# Patient Record
Sex: FEMALE | Race: WHITE | NOT HISPANIC OR LATINO | Employment: OTHER | URBAN - METROPOLITAN AREA
[De-identification: names, ages, dates, MRNs, and addresses within clinical notes are randomized per-mention and may not be internally consistent; named-entity substitution may affect disease eponyms.]

---

## 2017-02-27 ENCOUNTER — APPOINTMENT (OUTPATIENT)
Dept: WOMENS IMAGING | Facility: HOSPITAL | Age: 52
End: 2017-02-27

## 2017-02-27 PROCEDURE — 77063 BREAST TOMOSYNTHESIS BI: CPT | Performed by: RADIOLOGY

## 2017-02-27 PROCEDURE — 77067 SCR MAMMO BI INCL CAD: CPT | Performed by: RADIOLOGY

## 2017-05-20 ENCOUNTER — HOSPITAL ENCOUNTER (EMERGENCY)
Facility: HOSPITAL | Age: 52
Discharge: HOME | End: 2017-05-20
Attending: EMERGENCY MEDICINE | Admitting: EMERGENCY MEDICINE

## 2017-05-20 VITALS
DIASTOLIC BLOOD PRESSURE: 79 MMHG | BODY MASS INDEX: 18.95 KG/M2 | OXYGEN SATURATION: 100 % | WEIGHT: 111 LBS | TEMPERATURE: 97.9 F | SYSTOLIC BLOOD PRESSURE: 128 MMHG | HEART RATE: 109 BPM | RESPIRATION RATE: 16 BRPM | HEIGHT: 64 IN

## 2017-05-20 DIAGNOSIS — E06.9 THYROIDITIS: Primary | ICD-10-CM

## 2017-05-20 DIAGNOSIS — E05.90 HYPERTHYROIDISM: ICD-10-CM

## 2017-05-20 LAB
ALBUMIN SERPL-MCNC: 3.9 G/DL (ref 3.5–5.2)
ALBUMIN/GLOB SERPL: 1 G/DL
ALP SERPL-CCNC: 72 U/L (ref 39–117)
ALT SERPL W P-5'-P-CCNC: 16 U/L (ref 1–33)
ANION GAP SERPL CALCULATED.3IONS-SCNC: 13.7 MMOL/L
AST SERPL-CCNC: 16 U/L (ref 1–32)
BASOPHILS # BLD AUTO: 0.03 10*3/MM3 (ref 0–0.2)
BASOPHILS NFR BLD AUTO: 0.3 % (ref 0–1.5)
BILIRUB SERPL-MCNC: 0.8 MG/DL (ref 0.1–1.2)
BUN BLD-MCNC: 10 MG/DL (ref 6–20)
BUN/CREAT SERPL: 15.6 (ref 7–25)
CALCIUM SPEC-SCNC: 9.3 MG/DL (ref 8.6–10.5)
CHLORIDE SERPL-SCNC: 104 MMOL/L (ref 98–107)
CO2 SERPL-SCNC: 22.3 MMOL/L (ref 22–29)
CREAT BLD-MCNC: 0.64 MG/DL (ref 0.57–1)
DEPRECATED RDW RBC AUTO: 43.7 FL (ref 37–54)
EOSINOPHIL # BLD AUTO: 0.15 10*3/MM3 (ref 0–0.7)
EOSINOPHIL NFR BLD AUTO: 1.7 % (ref 0.3–6.2)
ERYTHROCYTE [DISTWIDTH] IN BLOOD BY AUTOMATED COUNT: 13.3 % (ref 11.7–13)
ERYTHROCYTE [SEDIMENTATION RATE] IN BLOOD: 66 MM/HR (ref 0–30)
GFR SERPL CREATININE-BSD FRML MDRD: 97 ML/MIN/1.73
GLOBULIN UR ELPH-MCNC: 3.8 GM/DL
GLUCOSE BLD-MCNC: 97 MG/DL (ref 65–99)
HCT VFR BLD AUTO: 32.5 % (ref 35.6–45.5)
HGB BLD-MCNC: 10.9 G/DL (ref 11.9–15.5)
IMM GRANULOCYTES # BLD: 0.02 10*3/MM3 (ref 0–0.03)
IMM GRANULOCYTES NFR BLD: 0.2 % (ref 0–0.5)
LYMPHOCYTES # BLD AUTO: 1.02 10*3/MM3 (ref 0.9–4.8)
LYMPHOCYTES NFR BLD AUTO: 11.8 % (ref 19.6–45.3)
MCH RBC QN AUTO: 30.4 PG (ref 26.9–32)
MCHC RBC AUTO-ENTMCNC: 33.5 G/DL (ref 32.4–36.3)
MCV RBC AUTO: 90.8 FL (ref 80.5–98.2)
MONOCYTES # BLD AUTO: 0.82 10*3/MM3 (ref 0.2–1.2)
MONOCYTES NFR BLD AUTO: 9.5 % (ref 5–12)
NEUTROPHILS # BLD AUTO: 6.59 10*3/MM3 (ref 1.9–8.1)
NEUTROPHILS NFR BLD AUTO: 76.5 % (ref 42.7–76)
PLATELET # BLD AUTO: 334 10*3/MM3 (ref 140–500)
PMV BLD AUTO: 8.7 FL (ref 6–12)
POTASSIUM BLD-SCNC: 4.1 MMOL/L (ref 3.5–5.2)
PROT SERPL-MCNC: 7.7 G/DL (ref 6–8.5)
RBC # BLD AUTO: 3.58 10*6/MM3 (ref 3.9–5.2)
SODIUM BLD-SCNC: 140 MMOL/L (ref 136–145)
T3FREE SERPL-MCNC: 6.25 PG/ML (ref 2–4.4)
T4 FREE SERPL-MCNC: 2.89 NG/DL (ref 0.93–1.7)
TSH SERPL DL<=0.05 MIU/L-ACNC: 0.01 MIU/ML (ref 0.27–4.2)
WBC NRBC COR # BLD: 8.63 10*3/MM3 (ref 4.5–10.7)

## 2017-05-20 PROCEDURE — 99283 EMERGENCY DEPT VISIT LOW MDM: CPT

## 2017-05-20 PROCEDURE — 93005 ELECTROCARDIOGRAM TRACING: CPT | Performed by: EMERGENCY MEDICINE

## 2017-05-20 PROCEDURE — 85652 RBC SED RATE AUTOMATED: CPT | Performed by: NURSE PRACTITIONER

## 2017-05-20 PROCEDURE — 84439 ASSAY OF FREE THYROXINE: CPT | Performed by: NURSE PRACTITIONER

## 2017-05-20 PROCEDURE — 80053 COMPREHEN METABOLIC PANEL: CPT | Performed by: NURSE PRACTITIONER

## 2017-05-20 PROCEDURE — 93010 ELECTROCARDIOGRAM REPORT: CPT | Performed by: INTERNAL MEDICINE

## 2017-05-20 PROCEDURE — 85025 COMPLETE CBC W/AUTO DIFF WBC: CPT | Performed by: NURSE PRACTITIONER

## 2017-05-20 PROCEDURE — 84481 FREE ASSAY (FT-3): CPT | Performed by: NURSE PRACTITIONER

## 2017-05-20 PROCEDURE — 84443 ASSAY THYROID STIM HORMONE: CPT | Performed by: NURSE PRACTITIONER

## 2017-05-20 RX ORDER — MELOXICAM 15 MG/1
15 TABLET ORAL DAILY
Qty: 14 TABLET | Refills: 0 | Status: SHIPPED | OUTPATIENT
Start: 2017-05-20 | End: 2019-01-02

## 2017-05-23 ENCOUNTER — TRANSCRIBE ORDERS (OUTPATIENT)
Dept: ADMINISTRATIVE | Facility: HOSPITAL | Age: 52
End: 2017-05-23

## 2017-05-23 DIAGNOSIS — E05.00 TOXIC GOITER: Primary | ICD-10-CM

## 2017-05-25 ENCOUNTER — HOSPITAL ENCOUNTER (OUTPATIENT)
Dept: ULTRASOUND IMAGING | Facility: HOSPITAL | Age: 52
Discharge: HOME | End: 2017-05-25
Admitting: INTERNAL MEDICINE

## 2017-05-25 DIAGNOSIS — E05.00 TOXIC GOITER: ICD-10-CM

## 2017-05-25 PROCEDURE — 76536 US EXAM OF HEAD AND NECK: CPT

## 2018-03-19 ENCOUNTER — APPOINTMENT (OUTPATIENT)
Dept: WOMENS IMAGING | Facility: HOSPITAL | Age: 53
End: 2018-03-19

## 2018-03-19 PROCEDURE — 77063 BREAST TOMOSYNTHESIS BI: CPT | Performed by: RADIOLOGY

## 2018-03-19 PROCEDURE — 77067 SCR MAMMO BI INCL CAD: CPT | Performed by: RADIOLOGY

## 2019-01-02 ENCOUNTER — OFFICE VISIT (OUTPATIENT)
Dept: INTERNAL MEDICINE | Facility: CLINIC | Age: 54
End: 2019-01-02

## 2019-01-02 VITALS
HEART RATE: 64 BPM | OXYGEN SATURATION: 100 % | SYSTOLIC BLOOD PRESSURE: 108 MMHG | WEIGHT: 115 LBS | BODY MASS INDEX: 19.63 KG/M2 | DIASTOLIC BLOOD PRESSURE: 54 MMHG | HEIGHT: 64 IN

## 2019-01-02 DIAGNOSIS — Z00.00 LABORATORY TESTS ORDERED AS PART OF A COMPLETE PHYSICAL EXAM (CPE): ICD-10-CM

## 2019-01-02 DIAGNOSIS — Z00.00 WELL ADULT EXAM: Primary | ICD-10-CM

## 2019-01-02 PROCEDURE — 93000 ELECTROCARDIOGRAM COMPLETE: CPT | Performed by: INTERNAL MEDICINE

## 2019-01-02 PROCEDURE — 99386 PREV VISIT NEW AGE 40-64: CPT | Performed by: INTERNAL MEDICINE

## 2019-01-02 RX ORDER — LEVOTHYROXINE SODIUM 0.05 MG/1
TABLET ORAL
COMMUNITY
Start: 2018-02-19 | End: 2021-03-26

## 2019-01-02 NOTE — PROGRESS NOTES
Subjective     Esperanza Deutsch is a 53 y.o. female who presents for a complete physical exam.      History of Present Illness     Patient here to establish care.      Review of Systems   Constitutional: Negative.    HENT: Negative.    Eyes: Negative.    Respiratory: Negative.    Cardiovascular: Negative.    Gastrointestinal: Negative.    Endocrine: Negative.    Genitourinary: Negative.    Musculoskeletal: Negative.    Skin: Negative.    Allergic/Immunologic: Negative.    Neurological: Negative.    Hematological: Negative.    Psychiatric/Behavioral: Negative.        The following portions of the patient's history were reviewed and updated as appropriate: allergies, current medications, past family history, past medical history, past social history, past surgical history and problem list.  Health maintenance tab was reviewed and updated with the patient.       Patient Active Problem List    Diagnosis Date Noted   • Allergic reaction 10/15/2016   • Other specified hypothyroidism 10/15/2016       Past Medical History:   Diagnosis Date   • Allergic reaction    • Anemia    • Disease of thyroid gland     SUBCLINICAL HYPOTHYROIDISM   • Herniated cervical disc    • Kidney stone 1999   • TMJ (dislocation of temporomandibular joint)    • Whiplash injury        Past Surgical History:   Procedure Laterality Date   • BREAST SURGERY Bilateral 2000    AUGMENTATION   • BREAST SURGERY Left     CYST   • COLONOSCOPY  11/20/2015     NO POLPS OR BXS   WNL  DR. BELTRAN   • TONSILLECTOMY AND ADENOIDECTOMY      AT AGE 5       Family History   Problem Relation Age of Onset   • Coronary artery disease Other    • Thrombocytopenia Daughter    • Cervical cancer Sister    • Other Paternal Uncle         BRAIN TUMOR   • Osteoporosis Maternal Grandmother    • Aneurysm Maternal Grandfather    • Lung cancer Paternal Grandfather    • Testicular cancer Paternal Uncle        Social History     Socioeconomic History   • Marital status:      Spouse  "name: Not on file   • Number of children: Not on file   • Years of education: Not on file   • Highest education level: Not on file   Social Needs   • Financial resource strain: Not on file   • Food insecurity - worry: Not on file   • Food insecurity - inability: Not on file   • Transportation needs - medical: Not on file   • Transportation needs - non-medical: Not on file   Occupational History   • Not on file   Tobacco Use   • Smoking status: Never Smoker   • Smokeless tobacco: Never Used   Substance and Sexual Activity   • Alcohol use: Yes     Comment: social   • Drug use: No   • Sexual activity: Yes     Partners: Male     Birth control/protection: Other     Comment: CONDOMS   Other Topics Concern   • Not on file   Social History Narrative   • Not on file       Current Outpatient Medications on File Prior to Visit   Medication Sig Dispense Refill   • Calcium Carbonate (CALCIUM 600 PO) Take  by mouth Daily.     • Cholecalciferol (VITAMIN D-3) 1000 UNITS capsule Take  by mouth.     • cyanocobalamin (CYANOCOBALAMIN) 100 MCG tablet tablet Take  by mouth Daily.     • Ferrous Sulfate (IRON) 325 (65 FE) MG tablet Take  by mouth Daily.     • levothyroxine (SYNTHROID, LEVOTHROID) 50 MCG tablet TK 1 T PO D     • MULTIPLE VITAMINS-MINERALS ER PO Take  by mouth Daily.     • [DISCONTINUED] diphenhydrAMINE (BENADRYL) 25 MG tablet Take  by mouth.     • [DISCONTINUED] meloxicam (MOBIC) 15 MG tablet Take 1 tablet by mouth Daily. 14 tablet 0     No current facility-administered medications on file prior to visit.        Allergies   Allergen Reactions   • Penicillins Anaphylaxis       Immunization History   Administered Date(s) Administered   • Flu Vaccine Quad PF 6-35MO 10/01/2018   • Hepatitis A 01/01/2016, 06/01/2016   • Influenza, Unspecified 09/01/2018       Objective     /54   Pulse 64   Ht 162.6 cm (64\")   Wt 52.2 kg (115 lb)   SpO2 100%   BMI 19.74 kg/m²     Physical Exam   Constitutional: She is oriented to " person, place, and time. She appears well-developed and well-nourished.   HENT:   Head: Normocephalic and atraumatic.   Right Ear: Hearing, tympanic membrane and external ear normal.   Left Ear: Hearing, tympanic membrane and external ear normal.   Nose: Nose normal.   Mouth/Throat: Oropharynx is clear and moist.   Neck: Neck supple. No thyromegaly present.   Cardiovascular: Normal rate, regular rhythm and normal heart sounds.   No murmur heard.  Pulmonary/Chest: Effort normal and breath sounds normal. Right breast exhibits no mass. Left breast exhibits no mass.   Abdominal: Soft. She exhibits no distension. There is no hepatosplenomegaly. There is no tenderness.   Genitourinary: No breast tenderness.   Lymphadenopathy:     She has no cervical adenopathy.   Neurological: She is alert and oriented to person, place, and time.   Skin: Skin is warm and dry.   Psychiatric: She has a normal mood and affect. Her speech is normal and behavior is normal. Judgment and thought content normal. Cognition and memory are normal.          ECG 12 Lead  Date/Time: 1/2/2019 4:18 PM  Performed by: Dasha Celeste MD  Authorized by: Dasha Celeste MD   Comparison: not compared with previous ECG   Previous ECG: no previous ECG available  Rhythm: sinus rhythm  Rate: normal  Conduction: conduction normal  T depression: V1 and V2  QRS axis: normal  Clinical impression: non-specific ECG              Assessment/Plan   Esperanza was seen today for annual exam.    Diagnoses and all orders for this visit:    Well adult exam    Laboratory tests ordered as part of a complete physical exam (CPE)  -     CBC & Differential  -     Comprehensive Metabolic Panel  -     Lipid Panel  -     Urinalysis With Microscopic - Urine, Clean Catch  -     Hepatitis C Antibody  -     Vitamin D 25 Hydroxy    Other orders  -     ECG 12 Lead        Discussion    Patient presents today for a CPE.      Patient follows a healthy diet.   Patient follows an adequate  exercise regimen. Mammogram is up to date.   Colon cancer screening is up to date.   Pap smears are performed by the patient's gynecologist.   DEXA is up to date.      I have recommended that the patient get the following immunizations:  Shingrix.      Health Maintenance   Topic Date Due   • ANNUAL PHYSICAL  04/24/1967   • TDAP/TD VACCINES (1 - Tdap) 04/24/1983   • ZOSTER VACCINE (1 of 2) 04/24/2014   • HEPATITIS C SCREENING  10/17/2016   • MAMMOGRAM  11/01/2020   • COLONOSCOPY  06/01/2021   • PAP SMEAR  11/01/2021   • INFLUENZA VACCINE  Completed   • HEPATITIS A VACCINE ADULT  Discontinued            Future Appointments   Date Time Provider Department Center   12/30/2019  8:00 AM [facility] None   1/6/2020  8:15 AM Dasha Celeste MD [facility] None

## 2019-01-03 LAB
25(OH)D3+25(OH)D2 SERPL-MCNC: 27.5 NG/ML (ref 30–100)
ALBUMIN SERPL-MCNC: 4.9 G/DL (ref 3.5–5.2)
ALBUMIN/GLOB SERPL: 2 G/DL
ALP SERPL-CCNC: 56 U/L (ref 39–117)
ALT SERPL-CCNC: 48 U/L (ref 1–33)
APPEARANCE UR: CLEAR
AST SERPL-CCNC: 35 U/L (ref 1–32)
BACTERIA #/AREA URNS HPF: NORMAL /HPF
BASOPHILS # BLD AUTO: 0.06 10*3/MM3 (ref 0–0.2)
BASOPHILS NFR BLD AUTO: 1 % (ref 0–1.5)
BILIRUB SERPL-MCNC: 0.8 MG/DL (ref 0.1–1.2)
BILIRUB UR QL STRIP: NEGATIVE
BUN SERPL-MCNC: 13 MG/DL (ref 6–20)
BUN/CREAT SERPL: 17.8 (ref 7–25)
CALCIUM SERPL-MCNC: 9.4 MG/DL (ref 8.6–10.5)
CASTS URNS MICRO: NORMAL
CHLORIDE SERPL-SCNC: 99 MMOL/L (ref 98–107)
CHOLEST SERPL-MCNC: 184 MG/DL (ref 0–200)
CO2 SERPL-SCNC: 25.4 MMOL/L (ref 22–29)
COLOR UR: YELLOW
CREAT SERPL-MCNC: 0.73 MG/DL (ref 0.57–1)
EOSINOPHIL # BLD AUTO: 0.1 10*3/MM3 (ref 0–0.7)
EOSINOPHIL NFR BLD AUTO: 1.7 % (ref 0.3–6.2)
EPI CELLS #/AREA URNS HPF: NORMAL /HPF
ERYTHROCYTE [DISTWIDTH] IN BLOOD BY AUTOMATED COUNT: 12.5 % (ref 11.7–13)
GLOBULIN SER CALC-MCNC: 2.5 GM/DL
GLUCOSE SERPL-MCNC: 82 MG/DL (ref 65–99)
GLUCOSE UR QL: NEGATIVE
HCT VFR BLD AUTO: 34.6 % (ref 35.6–45.5)
HCV AB S/CO SERPL IA: <0.1 S/CO RATIO (ref 0–0.9)
HDLC SERPL-MCNC: 66 MG/DL (ref 40–60)
HGB BLD-MCNC: 11.6 G/DL (ref 11.9–15.5)
HGB UR QL STRIP: NEGATIVE
IMM GRANULOCYTES # BLD: 0.01 10*3/MM3 (ref 0–0.03)
IMM GRANULOCYTES NFR BLD: 0.2 % (ref 0–0.5)
KETONES UR QL STRIP: (no result)
LDLC SERPL CALC-MCNC: 107 MG/DL (ref 0–100)
LEUKOCYTE ESTERASE UR QL STRIP: NEGATIVE
LYMPHOCYTES # BLD AUTO: 1.2 10*3/MM3 (ref 0.9–4.8)
LYMPHOCYTES NFR BLD AUTO: 20.9 % (ref 19.6–45.3)
MCH RBC QN AUTO: 30.9 PG (ref 26.9–32)
MCHC RBC AUTO-ENTMCNC: 33.5 G/DL (ref 32.4–36.3)
MCV RBC AUTO: 92 FL (ref 80.5–98.2)
MONOCYTES # BLD AUTO: 0.41 10*3/MM3 (ref 0.2–1.2)
MONOCYTES NFR BLD AUTO: 7.1 % (ref 5–12)
NEUTROPHILS # BLD AUTO: 3.98 10*3/MM3 (ref 1.9–8.1)
NEUTROPHILS NFR BLD AUTO: 69.3 % (ref 42.7–76)
NITRITE UR QL STRIP: NEGATIVE
PH UR STRIP: 6 [PH] (ref 5–8)
PLATELET # BLD AUTO: 315 10*3/MM3 (ref 140–500)
POTASSIUM SERPL-SCNC: 4.1 MMOL/L (ref 3.5–5.2)
PROT SERPL-MCNC: 7.4 G/DL (ref 6–8.5)
PROT UR QL STRIP: NEGATIVE
RBC # BLD AUTO: 3.76 10*6/MM3 (ref 3.9–5.2)
RBC #/AREA URNS HPF: NORMAL /HPF
SODIUM SERPL-SCNC: 139 MMOL/L (ref 136–145)
SP GR UR: 1.02 (ref 1–1.03)
TRIGL SERPL-MCNC: 56 MG/DL (ref 0–150)
UROBILINOGEN UR STRIP-MCNC: (no result) MG/DL
VLDLC SERPL CALC-MCNC: 11.2 MG/DL (ref 5–40)
WBC # BLD AUTO: 5.75 10*3/MM3 (ref 4.5–10.7)
WBC #/AREA URNS HPF: NORMAL /HPF

## 2019-01-04 DIAGNOSIS — R74.8 ABNORMAL LIVER ENZYMES: Primary | ICD-10-CM

## 2019-01-04 PROBLEM — D64.9 MILD CHRONIC ANEMIA: Status: ACTIVE | Noted: 2019-01-04

## 2019-01-04 PROBLEM — E55.9 VITAMIN D DEFICIENCY: Status: ACTIVE | Noted: 2019-01-04

## 2019-01-22 LAB
ALBUMIN SERPL-MCNC: 4.6 G/DL (ref 3.5–5.2)
ALP SERPL-CCNC: 43 U/L (ref 39–117)
ALT SERPL-CCNC: 17 U/L (ref 1–33)
AST SERPL-CCNC: 19 U/L (ref 1–32)
BILIRUB DIRECT SERPL-MCNC: 0.2 MG/DL (ref 0–0.3)
BILIRUB SERPL-MCNC: 1 MG/DL (ref 0.1–1.2)
PROT SERPL-MCNC: 7.2 G/DL (ref 6–8.5)

## 2019-03-25 ENCOUNTER — APPOINTMENT (OUTPATIENT)
Dept: WOMENS IMAGING | Facility: HOSPITAL | Age: 54
End: 2019-03-25

## 2019-03-25 PROCEDURE — 77067 SCR MAMMO BI INCL CAD: CPT | Performed by: RADIOLOGY

## 2019-03-25 PROCEDURE — 77063 BREAST TOMOSYNTHESIS BI: CPT | Performed by: RADIOLOGY

## 2019-12-24 DIAGNOSIS — Z00.00 HEALTH CARE MAINTENANCE: Primary | ICD-10-CM

## 2019-12-24 DIAGNOSIS — E55.9 VITAMIN D DEFICIENCY: ICD-10-CM

## 2019-12-31 LAB
25(OH)D3+25(OH)D2 SERPL-MCNC: 32.5 NG/ML (ref 30–100)
ALBUMIN SERPL-MCNC: 4.7 G/DL (ref 3.5–5.2)
ALBUMIN/GLOB SERPL: 1.9 G/DL
ALP SERPL-CCNC: 56 U/L (ref 39–117)
ALT SERPL-CCNC: 22 U/L (ref 1–33)
APPEARANCE UR: CLEAR
AST SERPL-CCNC: 22 U/L (ref 1–32)
BACTERIA #/AREA URNS HPF: NORMAL /HPF
BASOPHILS # BLD AUTO: 0.09 10*3/MM3 (ref 0–0.2)
BASOPHILS NFR BLD AUTO: 1.9 % (ref 0–1.5)
BILIRUB SERPL-MCNC: 0.8 MG/DL (ref 0.2–1.2)
BILIRUB UR QL STRIP: NEGATIVE
BUN SERPL-MCNC: 17 MG/DL (ref 6–20)
BUN/CREAT SERPL: 18.9 (ref 7–25)
CALCIUM SERPL-MCNC: 9.6 MG/DL (ref 8.6–10.5)
CHLORIDE SERPL-SCNC: 100 MMOL/L (ref 98–107)
CHOLEST SERPL-MCNC: 203 MG/DL (ref 0–200)
CO2 SERPL-SCNC: 24.9 MMOL/L (ref 22–29)
COLOR UR: YELLOW
CREAT SERPL-MCNC: 0.9 MG/DL (ref 0.57–1)
EOSINOPHIL # BLD AUTO: 0.31 10*3/MM3 (ref 0–0.4)
EOSINOPHIL NFR BLD AUTO: 6.4 % (ref 0.3–6.2)
EPI CELLS #/AREA URNS HPF: NORMAL /HPF (ref 0–10)
ERYTHROCYTE [DISTWIDTH] IN BLOOD BY AUTOMATED COUNT: 11.8 % (ref 12.3–15.4)
GLOBULIN SER CALC-MCNC: 2.5 GM/DL
GLUCOSE SERPL-MCNC: 92 MG/DL (ref 65–99)
GLUCOSE UR QL: NEGATIVE
HCT VFR BLD AUTO: 37.3 % (ref 34–46.6)
HDLC SERPL-MCNC: 80 MG/DL (ref 40–60)
HGB BLD-MCNC: 12.5 G/DL (ref 12–15.9)
HGB UR QL STRIP: NEGATIVE
IMM GRANULOCYTES # BLD AUTO: 0.01 10*3/MM3 (ref 0–0.05)
IMM GRANULOCYTES NFR BLD AUTO: 0.2 % (ref 0–0.5)
KETONES UR QL STRIP: NEGATIVE
LDLC SERPL CALC-MCNC: 111 MG/DL (ref 0–100)
LEUKOCYTE ESTERASE UR QL STRIP: NEGATIVE
LYMPHOCYTES # BLD AUTO: 1.52 10*3/MM3 (ref 0.7–3.1)
LYMPHOCYTES NFR BLD AUTO: 31.3 % (ref 19.6–45.3)
MCH RBC QN AUTO: 31 PG (ref 26.6–33)
MCHC RBC AUTO-ENTMCNC: 33.5 G/DL (ref 31.5–35.7)
MCV RBC AUTO: 92.6 FL (ref 79–97)
MICRO URNS: NORMAL
MICRO URNS: NORMAL
MONOCYTES # BLD AUTO: 0.48 10*3/MM3 (ref 0.1–0.9)
MONOCYTES NFR BLD AUTO: 9.9 % (ref 5–12)
MUCOUS THREADS URNS QL MICRO: PRESENT /HPF
NEUTROPHILS # BLD AUTO: 2.45 10*3/MM3 (ref 1.7–7)
NEUTROPHILS NFR BLD AUTO: 50.3 % (ref 42.7–76)
NITRITE UR QL STRIP: NEGATIVE
NRBC BLD AUTO-RTO: 0 /100 WBC (ref 0–0.2)
PH UR STRIP: 6 [PH] (ref 5–7.5)
PLATELET # BLD AUTO: 307 10*3/MM3 (ref 140–450)
POTASSIUM SERPL-SCNC: 4.7 MMOL/L (ref 3.5–5.2)
PROT SERPL-MCNC: 7.2 G/DL (ref 6–8.5)
PROT UR QL STRIP: NEGATIVE
RBC # BLD AUTO: 4.03 10*6/MM3 (ref 3.77–5.28)
RBC #/AREA URNS HPF: NORMAL /HPF (ref 0–2)
SODIUM SERPL-SCNC: 138 MMOL/L (ref 136–145)
SP GR UR: 1.02 (ref 1–1.03)
TRIGL SERPL-MCNC: 59 MG/DL (ref 0–150)
TSH SERPL DL<=0.005 MIU/L-ACNC: 3.94 UIU/ML (ref 0.27–4.2)
URINALYSIS REFLEX: NORMAL
UROBILINOGEN UR STRIP-MCNC: 0.2 MG/DL (ref 0.2–1)
VLDLC SERPL CALC-MCNC: 11.8 MG/DL
WBC # BLD AUTO: 4.86 10*3/MM3 (ref 3.4–10.8)
WBC #/AREA URNS HPF: NORMAL /HPF (ref 0–5)

## 2020-01-06 ENCOUNTER — OFFICE VISIT (OUTPATIENT)
Dept: INTERNAL MEDICINE | Facility: CLINIC | Age: 55
End: 2020-01-06

## 2020-01-06 VITALS
BODY MASS INDEX: 20.49 KG/M2 | SYSTOLIC BLOOD PRESSURE: 116 MMHG | DIASTOLIC BLOOD PRESSURE: 80 MMHG | HEART RATE: 61 BPM | OXYGEN SATURATION: 100 % | HEIGHT: 64 IN | WEIGHT: 120 LBS

## 2020-01-06 DIAGNOSIS — Z00.00 WELL ADULT EXAM: Primary | ICD-10-CM

## 2020-01-06 PROCEDURE — 99396 PREV VISIT EST AGE 40-64: CPT | Performed by: INTERNAL MEDICINE

## 2020-01-06 NOTE — PATIENT INSTRUCTIONS
Zoster Vaccine, Recombinant injection  What is this medicine?  ZOSTER VACCINE (ZOS ter vak SEEN) is used to prevent shingles in adults 50 years old and over. This vaccine is not used to treat shingles or nerve pain from shingles.  This medicine may be used for other purposes; ask your health care provider or pharmacist if you have questions.  COMMON BRAND NAME(S): SHINGRIX  What should I tell my health care provider before I take this medicine?  They need to know if you have any of these conditions:  -blood disorders or disease  -cancer like leukemia or lymphoma  -immune system problems or therapy  -an unusual or allergic reaction to vaccines, other medications, foods, dyes, or preservatives  -pregnant or trying to get pregnant  -breast-feeding  How should I use this medicine?  This vaccine is for injection in a muscle. It is given by a health care professional.  Talk to your pediatrician regarding the use of this medicine in children. This medicine is not approved for use in children.  Overdosage: If you think you have taken too much of this medicine contact a poison control center or emergency room at once.  NOTE: This medicine is only for you. Do not share this medicine with others.  What if I miss a dose?  Keep appointments for follow-up (booster) doses as directed. It is important not to miss your dose. Call your doctor or health care professional if you are unable to keep an appointment.  What may interact with this medicine?  -medicines that suppress your immune system  -medicines to treat cancer  -steroid medicines like prednisone or cortisone  This list may not describe all possible interactions. Give your health care provider a list of all the medicines, herbs, non-prescription drugs, or dietary supplements you use. Also tell them if you smoke, drink alcohol, or use illegal drugs. Some items may interact with your medicine.  What should I watch for while using this medicine?  Visit your doctor for regular  check ups.  This vaccine, like all vaccines, may not fully protect everyone.  What side effects may I notice from receiving this medicine?  Side effects that you should report to your doctor or health care professional as soon as possible:  -allergic reactions like skin rash, itching or hives, swelling of the face, lips, or tongue  -breathing problems  Side effects that usually do not require medical attention (report these to your doctor or health care professional if they continue or are bothersome):  -chills  -headache  -fever  -nausea, vomiting  -redness, warmth, pain, swelling or itching at site where injected  -tiredness  This list may not describe all possible side effects. Call your doctor for medical advice about side effects. You may report side effects to FDA at 8-546-VDH-9157.  Where should I keep my medicine?  This vaccine is only given in a clinic, pharmacy, doctor's office, or other health care setting and will not be stored at home.  NOTE: This sheet is a summary. It may not cover all possible information. If you have questions about this medicine, talk to your doctor, pharmacist, or health care provider.  © 2019 [employer]vier/Gold Standard (2018-07-30 13:20:30)

## 2020-01-06 NOTE — PROGRESS NOTES
Subjective     Esperanza Deutsch is a 54 y.o. female who presents for a complete physical exam.      History of Present Illness     Reviewed labs which overall look good.      Review of Systems   Constitutional: Negative.    HENT: Negative.    Eyes: Negative.    Respiratory: Negative.    Cardiovascular: Negative.    Gastrointestinal: Negative.    Endocrine: Negative.    Genitourinary: Negative.    Musculoskeletal: Negative.    Skin: Negative.    Allergic/Immunologic: Negative.    Neurological: Negative.    Hematological: Negative.    Psychiatric/Behavioral: Negative.        The following portions of the patient's history were reviewed and updated as appropriate: allergies, current medications, past family history, past medical history, past social history, past surgical history and problem list.  Health maintenance tab was reviewed and updated with the patient.       Patient Active Problem List    Diagnosis Date Noted   • Vitamin D deficiency 01/04/2019   • Mild chronic anemia 01/04/2019     Note Last Updated: 1/4/2019     Lifelong runs 10-11 hemoglobin.      • Allergic reaction 10/15/2016   • Other specified hypothyroidism 10/15/2016       Past Medical History:   Diagnosis Date   • Allergic     penicillin   • Allergic reaction    • Anemia    • Disease of thyroid gland     SUBCLINICAL HYPOTHYROIDISM   • Herniated cervical disc    • Hypothyroidism    • Kidney stone 1999   • TMJ (dislocation of temporomandibular joint)    • Whiplash injury        Past Surgical History:   Procedure Laterality Date   • BREAST SURGERY Bilateral 2000    AUGMENTATION   • BREAST SURGERY Left     CYST   • COLONOSCOPY  11/20/2015     NO POLPS OR BXS   WNL  DR. BELTRAN   • TONSILLECTOMY AND ADENOIDECTOMY      AT AGE 5       Family History   Problem Relation Age of Onset   • Coronary artery disease Other    • Thrombocytopenia Daughter    • Other Father         temporal arteritis   • Cervical cancer Sister    • Other Sister         seizures 2019   •  "Other Paternal Uncle         BRAIN TUMOR   • Osteoporosis Maternal Grandmother    • Aneurysm Maternal Grandfather    • Lung cancer Paternal Grandfather    • Testicular cancer Paternal Uncle        Social History     Socioeconomic History   • Marital status:      Spouse name: Not on file   • Number of children: Not on file   • Years of education: Not on file   • Highest education level: Not on file   Tobacco Use   • Smoking status: Never Smoker   • Smokeless tobacco: Never Used   Substance and Sexual Activity   • Alcohol use: Yes     Alcohol/week: 3.0 standard drinks     Types: 3 Glasses of wine per week     Comment: social   • Drug use: No   • Sexual activity: Yes     Partners: Male     Birth control/protection: Post-menopausal, Other     Comment: CONDOMS       Current Outpatient Medications on File Prior to Visit   Medication Sig Dispense Refill   • Calcium Carbonate (CALCIUM 600 PO) Take  by mouth Daily.     • Cholecalciferol (VITAMIN D-3) 1000 UNITS capsule Take  by mouth.     • cyanocobalamin (CYANOCOBALAMIN) 100 MCG tablet tablet Take  by mouth Daily.     • Ferrous Sulfate (IRON) 325 (65 FE) MG tablet Take  by mouth Daily.     • levothyroxine (SYNTHROID, LEVOTHROID) 50 MCG tablet TK 1 T PO D     • MULTIPLE VITAMINS-MINERALS ER PO Take  by mouth Daily.       No current facility-administered medications on file prior to visit.        Allergies   Allergen Reactions   • Penicillins Anaphylaxis       Immunization History   Administered Date(s) Administered   • Flu Vaccine Quad PF 6-35MO 10/01/2018   • Hepatitis A 01/01/2016, 06/01/2016   • Influenza, Unspecified 02/09/2018, 09/01/2018, 11/22/2019       Objective     /80   Pulse 61   Ht 162.6 cm (64.02\")   Wt 54.4 kg (120 lb)   SpO2 100%   BMI 20.59 kg/m²     Physical Exam   Constitutional: She is oriented to person, place, and time. She appears well-developed and well-nourished.   HENT:   Head: Normocephalic and atraumatic.   Right Ear: Hearing, " tympanic membrane and external ear normal.   Left Ear: Hearing, tympanic membrane and external ear normal.   Nose: Nose normal.   Mouth/Throat: Oropharynx is clear and moist.   Neck: Neck supple. No thyromegaly present.   Cardiovascular: Normal rate, regular rhythm and normal heart sounds.   No murmur heard.  Pulmonary/Chest: Effort normal and breath sounds normal. Right breast exhibits no mass. Left breast exhibits no mass. No breast tenderness.   Abdominal: Soft. She exhibits no distension. There is no hepatosplenomegaly. There is no tenderness.   Genitourinary: No breast tenderness.   Lymphadenopathy:     She has no cervical adenopathy.   Neurological: She is alert and oriented to person, place, and time.   Skin: Skin is warm and dry.   Psychiatric: She has a normal mood and affect. Her speech is normal and behavior is normal. Judgment and thought content normal. Cognition and memory are normal.       Assessment/Plan   Esperanza was seen today for annual exam.    Diagnoses and all orders for this visit:    Well adult exam        Discussion    Patient presents today for a CPE.      Patient follows a healthy diet.   Patient follows an adequate exercise regimen. Mammogram is up to date.   Colon cancer screening is up to date.   Pap smears are performed by the patient's gynecologist.   I have recommended that the patient get the following immunizations:  Shingrix and tdap.      Health Maintenance   Topic Date Due   • TDAP/TD VACCINES (1 - Tdap) 04/24/1975   • ZOSTER VACCINE (1 of 2) 04/24/2014   • MAMMOGRAM  11/01/2020   • ANNUAL PHYSICAL  01/07/2021   • COLONOSCOPY  06/01/2021   • PAP SMEAR  11/01/2021   • HEPATITIS C SCREENING  Completed   • INFLUENZA VACCINE  Completed            No future appointments.

## 2020-07-16 ENCOUNTER — APPOINTMENT (OUTPATIENT)
Dept: WOMENS IMAGING | Facility: HOSPITAL | Age: 55
End: 2020-07-16

## 2020-07-16 PROCEDURE — 77063 BREAST TOMOSYNTHESIS BI: CPT | Performed by: RADIOLOGY

## 2020-07-16 PROCEDURE — 77067 SCR MAMMO BI INCL CAD: CPT | Performed by: RADIOLOGY

## 2020-12-29 DIAGNOSIS — E55.9 VITAMIN D DEFICIENCY: ICD-10-CM

## 2020-12-29 DIAGNOSIS — Z00.00 HEALTH CARE MAINTENANCE: Primary | ICD-10-CM

## 2020-12-29 DIAGNOSIS — D64.9 MILD CHRONIC ANEMIA: ICD-10-CM

## 2020-12-29 DIAGNOSIS — E03.8 OTHER SPECIFIED HYPOTHYROIDISM: ICD-10-CM

## 2021-01-04 ENCOUNTER — TELEPHONE (OUTPATIENT)
Dept: INTERNAL MEDICINE | Facility: CLINIC | Age: 56
End: 2021-01-04

## 2021-01-04 NOTE — TELEPHONE ENCOUNTER
PATIENT WOULD LIKE TO ADD A TSH, POTASSIUM LEVEL , AND VIT D LEVEL. SHE IS HAVING LABWORK TOMORROW AND WOULD LIKE THESE DONE AS WELL. PATIENT IS ALSO ASKING IF THE LABWORK WILL BE DONE IN THE SAME BUILDING .    PLEASE ADVISE,    [phone]

## 2021-01-04 NOTE — TELEPHONE ENCOUNTER
Called patient and advised her that Dr Celeste had already ordered those tests.   She comes to our office for lab appointment.

## 2021-01-06 LAB
25(OH)D3+25(OH)D2 SERPL-MCNC: 43.9 NG/ML (ref 30–100)
ALBUMIN SERPL-MCNC: 4.9 G/DL (ref 3.5–5.2)
ALBUMIN/GLOB SERPL: 2 G/DL
ALP SERPL-CCNC: 69 U/L (ref 39–117)
ALT SERPL-CCNC: 25 U/L (ref 1–33)
APPEARANCE UR: CLEAR
AST SERPL-CCNC: 18 U/L (ref 1–32)
BACTERIA #/AREA URNS HPF: NORMAL /HPF
BASOPHILS # BLD AUTO: 0.1 10*3/MM3 (ref 0–0.2)
BASOPHILS NFR BLD AUTO: 2.1 % (ref 0–1.5)
BILIRUB SERPL-MCNC: 0.9 MG/DL (ref 0–1.2)
BILIRUB UR QL STRIP: NEGATIVE
BUN SERPL-MCNC: 20 MG/DL (ref 6–20)
BUN/CREAT SERPL: 21.5 (ref 7–25)
CALCIUM SERPL-MCNC: 10 MG/DL (ref 8.6–10.5)
CHLORIDE SERPL-SCNC: 103 MMOL/L (ref 98–107)
CHOLEST SERPL-MCNC: 200 MG/DL (ref 0–200)
CO2 SERPL-SCNC: 26.9 MMOL/L (ref 22–29)
COLOR UR: YELLOW
CREAT SERPL-MCNC: 0.93 MG/DL (ref 0.57–1)
EOSINOPHIL # BLD AUTO: 0.23 10*3/MM3 (ref 0–0.4)
EOSINOPHIL NFR BLD AUTO: 4.9 % (ref 0.3–6.2)
EPI CELLS #/AREA URNS HPF: NORMAL /HPF (ref 0–10)
ERYTHROCYTE [DISTWIDTH] IN BLOOD BY AUTOMATED COUNT: 11.7 % (ref 12.3–15.4)
GLOBULIN SER CALC-MCNC: 2.5 GM/DL
GLUCOSE SERPL-MCNC: 97 MG/DL (ref 65–99)
GLUCOSE UR QL: NEGATIVE
HCT VFR BLD AUTO: 37.9 % (ref 34–46.6)
HDLC SERPL-MCNC: 77 MG/DL (ref 40–60)
HGB BLD-MCNC: 12.8 G/DL (ref 12–15.9)
HGB UR QL STRIP: NEGATIVE
IMM GRANULOCYTES # BLD AUTO: 0.01 10*3/MM3 (ref 0–0.05)
IMM GRANULOCYTES NFR BLD AUTO: 0.2 % (ref 0–0.5)
KETONES UR QL STRIP: NEGATIVE
LDLC SERPL CALC-MCNC: 111 MG/DL (ref 0–100)
LEUKOCYTE ESTERASE UR QL STRIP: NEGATIVE
LYMPHOCYTES # BLD AUTO: 1.41 10*3/MM3 (ref 0.7–3.1)
LYMPHOCYTES NFR BLD AUTO: 29.9 % (ref 19.6–45.3)
MCH RBC QN AUTO: 31.5 PG (ref 26.6–33)
MCHC RBC AUTO-ENTMCNC: 33.8 G/DL (ref 31.5–35.7)
MCV RBC AUTO: 93.3 FL (ref 79–97)
MICRO URNS: NORMAL
MICRO URNS: NORMAL
MONOCYTES # BLD AUTO: 0.41 10*3/MM3 (ref 0.1–0.9)
MONOCYTES NFR BLD AUTO: 8.7 % (ref 5–12)
MUCOUS THREADS URNS QL MICRO: PRESENT /HPF
NEUTROPHILS # BLD AUTO: 2.56 10*3/MM3 (ref 1.7–7)
NEUTROPHILS NFR BLD AUTO: 54.2 % (ref 42.7–76)
NITRITE UR QL STRIP: NEGATIVE
NRBC BLD AUTO-RTO: 0 /100 WBC (ref 0–0.2)
PH UR STRIP: 6.5 [PH] (ref 5–7.5)
PLATELET # BLD AUTO: 312 10*3/MM3 (ref 140–450)
POTASSIUM SERPL-SCNC: 4.9 MMOL/L (ref 3.5–5.2)
PROT SERPL-MCNC: 7.4 G/DL (ref 6–8.5)
PROT UR QL STRIP: NEGATIVE
RBC # BLD AUTO: 4.06 10*6/MM3 (ref 3.77–5.28)
RBC #/AREA URNS HPF: NORMAL /HPF (ref 0–2)
SODIUM SERPL-SCNC: 139 MMOL/L (ref 136–145)
SP GR UR: 1.01 (ref 1–1.03)
T4 FREE SERPL-MCNC: 1.43 NG/DL (ref 0.93–1.7)
TRIGL SERPL-MCNC: 63 MG/DL (ref 0–150)
TSH SERPL DL<=0.005 MIU/L-ACNC: 5.02 UIU/ML (ref 0.27–4.2)
URINALYSIS REFLEX: NORMAL
UROBILINOGEN UR STRIP-MCNC: 0.2 MG/DL (ref 0.2–1)
VLDLC SERPL CALC-MCNC: 12 MG/DL (ref 5–40)
WBC # BLD AUTO: 4.72 10*3/MM3 (ref 3.4–10.8)
WBC #/AREA URNS HPF: NORMAL /HPF (ref 0–5)

## 2021-01-08 ENCOUNTER — OFFICE VISIT (OUTPATIENT)
Dept: INTERNAL MEDICINE | Facility: CLINIC | Age: 56
End: 2021-01-08

## 2021-01-08 ENCOUNTER — TELEPHONE (OUTPATIENT)
Dept: GASTROENTEROLOGY | Facility: CLINIC | Age: 56
End: 2021-01-08

## 2021-01-08 VITALS
BODY MASS INDEX: 19.97 KG/M2 | WEIGHT: 117 LBS | OXYGEN SATURATION: 98 % | SYSTOLIC BLOOD PRESSURE: 98 MMHG | DIASTOLIC BLOOD PRESSURE: 70 MMHG | HEART RATE: 64 BPM | TEMPERATURE: 97.1 F | HEIGHT: 64 IN

## 2021-01-08 DIAGNOSIS — Z12.11 COLON CANCER SCREENING: ICD-10-CM

## 2021-01-08 DIAGNOSIS — E03.8 OTHER SPECIFIED HYPOTHYROIDISM: ICD-10-CM

## 2021-01-08 DIAGNOSIS — Z00.00 WELL ADULT EXAM: Primary | ICD-10-CM

## 2021-01-08 LAB — TSH SERPL DL<=0.005 MIU/L-ACNC: 3.86 UIU/ML (ref 0.27–4.2)

## 2021-01-08 PROCEDURE — 99396 PREV VISIT EST AGE 40-64: CPT | Performed by: INTERNAL MEDICINE

## 2021-01-08 NOTE — PROGRESS NOTES
Subjective     Esperanza Deutsch is a 55 y.o. female who presents for a complete physical exam.      History of Present Illness     Mild increase in TSH.      Review of Systems   Constitutional: Negative.    HENT: Negative.    Eyes: Negative.    Respiratory: Negative.    Cardiovascular: Negative.    Gastrointestinal: Negative.    Endocrine: Negative.    Genitourinary: Negative.    Musculoskeletal: Negative.    Skin: Negative.    Allergic/Immunologic: Negative.    Neurological: Negative.    Hematological: Negative.    Psychiatric/Behavioral: Negative.        The following portions of the patient's history were reviewed and updated as appropriate: allergies, current medications, past family history, past medical history, past social history, past surgical history and problem list.  Health maintenance tab was reviewed and updated with the patient.       Patient Active Problem List    Diagnosis Date Noted   • Vitamin D deficiency 01/04/2019   • Mild chronic anemia 01/04/2019     Note Last Updated: 1/4/2019     Lifelong runs 10-11 hemoglobin.      • Other specified hypothyroidism 10/15/2016       Past Medical History:   Diagnosis Date   • Allergic     penicillin   • Allergic reaction    • Anemia    • Disease of thyroid gland     SUBCLINICAL HYPOTHYROIDISM   • Herniated cervical disc    • Hypothyroidism    • Kidney stone 1999   • TMJ (dislocation of temporomandibular joint)    • Whiplash injury        Past Surgical History:   Procedure Laterality Date   • BREAST SURGERY Bilateral 2000    AUGMENTATION   • BREAST SURGERY Left     CYST   • COLONOSCOPY  11/20/2015     NO POLPS OR BXS   WNL  DR. BELTRAN   • TONSILLECTOMY AND ADENOIDECTOMY      AT AGE 5       Family History   Problem Relation Age of Onset   • Coronary artery disease Other    • Thrombocytopenia Daughter    • Other Father         temporal arteritis   • Cervical cancer Sister    • Seizures Sister         seizures 2019   • Other Paternal Uncle         BRAIN TUMOR   •  "Osteoporosis Maternal Grandmother    • Aneurysm Maternal Grandfather    • Lung cancer Paternal Grandfather    • Testicular cancer Paternal Uncle        Social History     Socioeconomic History   • Marital status:      Spouse name: Not on file   • Number of children: Not on file   • Years of education: Not on file   • Highest education level: Not on file   Tobacco Use   • Smoking status: Never Smoker   • Smokeless tobacco: Never Used   Substance and Sexual Activity   • Alcohol use: Yes     Alcohol/week: 3.0 standard drinks     Types: 3 Glasses of wine per week     Comment: social   • Drug use: No   • Sexual activity: Yes     Partners: Male     Birth control/protection: Post-menopausal, Other     Comment: CONDOMS       Current Outpatient Medications on File Prior to Visit   Medication Sig Dispense Refill   • Calcium Carbonate (CALCIUM 600 PO) Take  by mouth Daily.     • Cholecalciferol (VITAMIN D-3) 1000 UNITS capsule Take  by mouth.     • cyanocobalamin (CYANOCOBALAMIN) 100 MCG tablet tablet Take  by mouth Daily.     • Ferrous Sulfate (IRON) 325 (65 FE) MG tablet Take  by mouth Daily.     • levothyroxine (SYNTHROID, LEVOTHROID) 50 MCG tablet TK 1 T PO D     • MULTIPLE VITAMINS-MINERALS ER PO Take  by mouth Daily.       No current facility-administered medications on file prior to visit.        Allergies   Allergen Reactions   • Penicillins Anaphylaxis       Immunization History   Administered Date(s) Administered   • Flu Vaccine Quad PF 6-35MO 10/01/2018   • Flulaval/Fluarix/Fluzone Quad 10/30/2020   • Hepatitis A 01/01/2016, 06/01/2016   • Influenza, Unspecified 02/09/2018, 09/01/2018, 11/22/2019       Objective     BP 98/70 (BP Location: Right arm, Patient Position: Sitting, Cuff Size: Adult)   Pulse 64   Temp 97.1 °F (36.2 °C) (Temporal)   Ht 162.6 cm (64.02\")   Wt 53.1 kg (117 lb)   SpO2 98%   BMI 20.07 kg/m²     Physical Exam  Constitutional:       Appearance: She is well-developed.   HENT:      " Head: Normocephalic and atraumatic.      Right Ear: Hearing, tympanic membrane and external ear normal.      Left Ear: Hearing, tympanic membrane and external ear normal.      Nose: Nose normal.   Neck:      Musculoskeletal: Neck supple.      Thyroid: No thyromegaly.   Cardiovascular:      Rate and Rhythm: Normal rate and regular rhythm.      Heart sounds: Normal heart sounds. No murmur.   Pulmonary:      Effort: Pulmonary effort is normal.      Breath sounds: Normal breath sounds.   Chest:      Breasts:         Right: No mass.         Left: No mass.   Abdominal:      General: There is no distension.      Palpations: Abdomen is soft.      Tenderness: There is no abdominal tenderness.   Lymphadenopathy:      Cervical: No cervical adenopathy.   Skin:     General: Skin is warm and dry.   Neurological:      Mental Status: She is alert and oriented to person, place, and time.   Psychiatric:         Speech: Speech normal.         Behavior: Behavior normal.         Thought Content: Thought content normal.         Judgment: Judgment normal.         Assessment/Plan   Diagnoses and all orders for this visit:    1. Well adult exam (Primary)    2. Other specified hypothyroidism  -     TSH Rfx On Abnormal To Free T4    3. Colon cancer screening  -     Cancel: Ambulatory Referral to Gastroenterology  -     Ambulatory Referral For Screening Colonoscopy        Discussion    Patient presents today for a CPE.      Patient follows a healthy diet.   Patient follows an adequate exercise regimen. Mammogram is up to date.   Patient has been referred for colon cancer screening.   Pap smears are performed by the patient's gynecologist.  I have recommended that the patient get the following immunizations:  Shingrix and tdap.      Health Maintenance   Topic Date Due   • TDAP/TD VACCINES (1 - Tdap) 04/24/1983   • ZOSTER VACCINE (1 of 2) 04/24/2014   • ANNUAL PHYSICAL  01/07/2021   • COLONOSCOPY  06/01/2021   • MAMMOGRAM  11/01/2021   • PAP  SMEAR  11/01/2022   • HEPATITIS C SCREENING  Completed   • INFLUENZA VACCINE  Completed   • Pneumococcal Vaccine 0-64  Aged Out   • MENINGOCOCCAL VACCINE  Aged Out            No future appointments.

## 2021-01-27 ENCOUNTER — TELEPHONE (OUTPATIENT)
Dept: INTERNAL MEDICINE | Facility: CLINIC | Age: 56
End: 2021-01-27

## 2021-01-27 NOTE — TELEPHONE ENCOUNTER
.    Caller: Esperanza Deutsch DDS    Relationship: Self    Best call back number: [phone]*    What test was performed: COVID ANTIBODIES    When was the test performed: 01/05/21    Where was the test performed: IN-OFFICE    Additional notes: PATIENT REQUESTING A CALLBACK WITH RESULTS OF COVID ANTIBODIES LAB TEST.

## 2021-01-27 NOTE — TELEPHONE ENCOUNTER
Called patient and left voicemail  [facility] is about 8 weeks backlogged on mailing results. Patient may create account with [facility] and access it from there. We won't get them/

## 2021-03-26 ENCOUNTER — OFFICE VISIT (OUTPATIENT)
Dept: INTERNAL MEDICINE | Facility: CLINIC | Age: 56
End: 2021-03-26

## 2021-03-26 VITALS
OXYGEN SATURATION: 98 % | BODY MASS INDEX: 20.14 KG/M2 | WEIGHT: 118 LBS | HEART RATE: 74 BPM | DIASTOLIC BLOOD PRESSURE: 72 MMHG | SYSTOLIC BLOOD PRESSURE: 130 MMHG | HEIGHT: 64 IN

## 2021-03-26 DIAGNOSIS — M54.6 THORACIC BACK PAIN, UNSPECIFIED BACK PAIN LATERALITY, UNSPECIFIED CHRONICITY: ICD-10-CM

## 2021-03-26 DIAGNOSIS — M54.2 NECK PAIN: Primary | ICD-10-CM

## 2021-03-26 DIAGNOSIS — M54.50 LUMBAR BACK PAIN: ICD-10-CM

## 2021-03-26 DIAGNOSIS — M33.90 HELIOTROPE EYELID RASH (HCC): ICD-10-CM

## 2021-03-26 DIAGNOSIS — M79.10 MUSCLE PAIN: ICD-10-CM

## 2021-03-26 PROCEDURE — 72050 X-RAY EXAM NECK SPINE 4/5VWS: CPT | Performed by: INTERNAL MEDICINE

## 2021-03-26 PROCEDURE — 72072 X-RAY EXAM THORAC SPINE 3VWS: CPT | Performed by: INTERNAL MEDICINE

## 2021-03-26 PROCEDURE — 99214 OFFICE O/P EST MOD 30 MIN: CPT | Performed by: INTERNAL MEDICINE

## 2021-03-26 PROCEDURE — 72110 X-RAY EXAM L-2 SPINE 4/>VWS: CPT | Performed by: INTERNAL MEDICINE

## 2021-03-26 RX ORDER — LEVOTHYROXINE SODIUM 0.07 MG/1
TABLET ORAL
COMMUNITY
Start: 2021-03-25 | End: 2023-02-22

## 2021-03-26 RX ORDER — CYCLOBENZAPRINE HCL 10 MG
10 TABLET ORAL NIGHTLY
Qty: 30 TABLET | Refills: 0 | Status: SHIPPED | OUTPATIENT
Start: 2021-03-26 | End: 2021-07-15

## 2021-03-26 NOTE — PROGRESS NOTES
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  Started months ago.  Left sided.  Muscle spasms in thoracic spine as well.  Left leg sciatica.  Left flank pain.  Her left leg feels weak.  Neck is tight as well.  She is getting muscle twitching in the left leg.      Breast pain and sensitivity as well.  Started 3 weeks ago.  Better now.      Tingling in scalp as well.  Moves around on head.      Rash around eye being seen by dermatologist who is worried about dermatomyositis.        Review of Systems   Constitutional: Negative for fever.   Respiratory: Negative.    Cardiovascular: Negative.    Musculoskeletal: Positive for arthralgias and myalgias.       The following portions of the patient's history were reviewed and updated as appropriate: allergies, current medications and problem list.    Patient Active Problem List    Diagnosis Date Noted   • Vitamin D deficiency 01/04/2019   • Mild chronic anemia 01/04/2019     Note Last Updated: 1/4/2019     Lifelong runs 10-11 hemoglobin.      • Other specified hypothyroidism 10/15/2016       Current Outpatient Medications on File Prior to Visit   Medication Sig Dispense Refill   • Calcium Carbonate (CALCIUM 600 PO) Take  by mouth Daily.     • Cholecalciferol (VITAMIN D-3) 1000 UNITS capsule Take  by mouth.     • cyanocobalamin (CYANOCOBALAMIN) 100 MCG tablet tablet Take  by mouth Daily.     • Ferrous Sulfate (IRON) 325 (65 FE) MG tablet Take  by mouth Daily.     • levothyroxine (SYNTHROID, LEVOTHROID) 75 MCG tablet      • MULTIPLE VITAMINS-MINERALS ER PO Take  by mouth Daily.     • [DISCONTINUED] levothyroxine (SYNTHROID, LEVOTHROID) 50 MCG tablet TK 1 T PO D       No current facility-administered medications on file prior to visit.       Objective     /72   Pulse 74   Ht 162.6 cm (64.02\")   Wt 53.5 kg (118 lb)   SpO2 98% "   BMI 20.24 kg/m²     Physical Exam  Constitutional:       Appearance: She is well-developed.   HENT:      Head: Normocephalic and atraumatic.      Right Ear: Hearing and tympanic membrane normal.      Left Ear: Hearing and tympanic membrane normal.      Mouth/Throat:      Pharynx: No oropharyngeal exudate or posterior oropharyngeal erythema.   Cardiovascular:      Rate and Rhythm: Normal rate and regular rhythm.      Heart sounds: Normal heart sounds.   Pulmonary:      Effort: Pulmonary effort is normal.      Breath sounds: Normal breath sounds.   Chest:      Breasts:         Right: Normal.         Left: Normal.   Skin:     General: Skin is warm and dry.   Neurological:      Mental Status: She is alert and oriented to person, place, and time.      Sensory: Sensation is intact.      Motor: Motor function is intact.      Coordination: Coordination is intact.      Gait: Gait is intact.      Deep Tendon Reflexes:      Reflex Scores:       Bicep reflexes are 2+ on the right side and 2+ on the left side.       Brachioradialis reflexes are 2+ on the right side and 2+ on the left side.       Patellar reflexes are 2+ on the right side and 2+ on the left side.       Achilles reflexes are 2+ on the right side and 2+ on the left side.  Psychiatric:         Behavior: Behavior normal.       X-rays of the neck, thoracic, lumbar  performed today for following indication:   pain.  X-ray reveal straightening of cervical spine, DDD in lumbar, NAD in thoracic.  There is no available x-ray for comparison.  X-ray sent to radiology for official interpretation and findings.        Assessment/Plan   Diagnoses and all orders for this visit:    1. Neck pain (Primary)  -     XR Spine Thoracic 3 View  -     XR Spine Lumbar Complete 4+VW  -     XR Spine Cervical Complete 4 or 5 View  -     Ambulatory Referral to Physical Therapy Evaluate and treat    2. Thoracic back pain, unspecified back pain laterality, unspecified chronicity  -     XR  Spine Thoracic 3 View  -     XR Spine Lumbar Complete 4+VW  -     XR Spine Cervical Complete 4 or 5 View  -     Ambulatory Referral to Physical Therapy Evaluate and treat    3. Lumbar back pain  -     XR Spine Thoracic 3 View  -     XR Spine Lumbar Complete 4+VW  -     XR Spine Cervical Complete 4 or 5 View  -     Ambulatory Referral to Physical Therapy Evaluate and treat    4. Heliotrope eyelid rash (CMS/HCC)  -     CBC & Differential  -     Comprehensive Metabolic Panel  -     ELA With / DsDNA, RNP, Sjogrens A / B, Smith  -     C-reactive Protein  -     Sedimentation Rate  -     CK  -     Aldolase    5. Muscle pain  -     CBC & Differential  -     Comprehensive Metabolic Panel  -     LEA With / DsDNA, RNP, Sjogrens A / B, Smith  -     C-reactive Protein  -     Sedimentation Rate  -     CK  -     Aldolase    Other orders  -     cyclobenzaprine (FLEXERIL) 10 MG tablet; Take 1 tablet by mouth Every Night.  Dispense: 30 tablet; Refill: 0        Discussion    Patient presents with neck and back pain.  I discussed with the patient a trial of conservative management with:   physical therapy and muscle relaxer.  Let me know if not feeling better over the next several weeks or if there is any change in symptoms.  Check additional labs for muscle pain and rash.  Per patient dermatology has referred to rheumatology.           Future Appointments   Date Time Provider Department Center   1/7/2022  8:00 AM [facility]   1/14/2022  8:15 AM Dasha Celeste MD [facility]

## 2021-03-29 ENCOUNTER — TELEPHONE (OUTPATIENT)
Dept: INTERNAL MEDICINE | Facility: CLINIC | Age: 56
End: 2021-03-29

## 2021-03-29 DIAGNOSIS — M33.90 HELIOTROPE EYELID RASH (HCC): ICD-10-CM

## 2021-03-29 DIAGNOSIS — M79.10 MUSCLE PAIN: ICD-10-CM

## 2021-03-29 DIAGNOSIS — R76.8 ANA POSITIVE: ICD-10-CM

## 2021-03-29 DIAGNOSIS — M54.6 THORACIC BACK PAIN, UNSPECIFIED BACK PAIN LATERALITY, UNSPECIFIED CHRONICITY: ICD-10-CM

## 2021-03-29 DIAGNOSIS — M54.2 NECK PAIN: Primary | ICD-10-CM

## 2021-03-29 DIAGNOSIS — M54.50 LUMBAR BACK PAIN: ICD-10-CM

## 2021-03-29 LAB
ALBUMIN SERPL-MCNC: 5.1 G/DL (ref 3.5–5.2)
ALBUMIN/GLOB SERPL: 2 G/DL
ALDOLASE SERPL-CCNC: 4.3 U/L (ref 3.3–10.3)
ALP SERPL-CCNC: 72 U/L (ref 39–117)
ALT SERPL-CCNC: 33 U/L (ref 1–33)
ANA SER QL: POSITIVE
AST SERPL-CCNC: 27 U/L (ref 1–32)
BASOPHILS # BLD AUTO: 0.07 10*3/MM3 (ref 0–0.2)
BASOPHILS NFR BLD AUTO: 0.9 % (ref 0–1.5)
BILIRUB SERPL-MCNC: 1.1 MG/DL (ref 0–1.2)
BUN SERPL-MCNC: 17 MG/DL (ref 6–20)
BUN/CREAT SERPL: 20.7 (ref 7–25)
CALCIUM SERPL-MCNC: 9.5 MG/DL (ref 8.6–10.5)
CENTROMERE B AB SER-ACNC: <0.2 AI (ref 0–0.9)
CHLORIDE SERPL-SCNC: 103 MMOL/L (ref 98–107)
CHROMATIN AB SERPL-ACNC: <0.2 AI (ref 0–0.9)
CK SERPL-CCNC: 90 U/L (ref 20–180)
CO2 SERPL-SCNC: 24.5 MMOL/L (ref 22–29)
CREAT SERPL-MCNC: 0.82 MG/DL (ref 0.57–1)
CRP SERPL-MCNC: <0.3 MG/DL (ref 0–0.5)
DSDNA AB SER-ACNC: 1 IU/ML (ref 0–9)
ENA JO1 AB SER-ACNC: <0.2 AI (ref 0–0.9)
ENA RNP AB SER-ACNC: 1.8 AI (ref 0–0.9)
ENA SCL70 AB SER-ACNC: <0.2 AI (ref 0–0.9)
ENA SM AB SER-ACNC: <0.2 AI (ref 0–0.9)
ENA SS-A AB SER-ACNC: <0.2 AI (ref 0–0.9)
ENA SS-B AB SER-ACNC: <0.2 AI (ref 0–0.9)
EOSINOPHIL # BLD AUTO: 0.05 10*3/MM3 (ref 0–0.4)
EOSINOPHIL NFR BLD AUTO: 0.6 % (ref 0.3–6.2)
ERYTHROCYTE [DISTWIDTH] IN BLOOD BY AUTOMATED COUNT: 11.8 % (ref 12.3–15.4)
ERYTHROCYTE [SEDIMENTATION RATE] IN BLOOD BY WESTERGREN METHOD: 7 MM/HR (ref 0–30)
GLOBULIN SER CALC-MCNC: 2.6 GM/DL
GLUCOSE SERPL-MCNC: 91 MG/DL (ref 65–99)
HCT VFR BLD AUTO: 37.3 % (ref 34–46.6)
HGB BLD-MCNC: 13 G/DL (ref 12–15.9)
IMM GRANULOCYTES # BLD AUTO: 0.03 10*3/MM3 (ref 0–0.05)
IMM GRANULOCYTES NFR BLD AUTO: 0.4 % (ref 0–0.5)
LYMPHOCYTES # BLD AUTO: 0.73 10*3/MM3 (ref 0.7–3.1)
LYMPHOCYTES NFR BLD AUTO: 9.2 % (ref 19.6–45.3)
Lab: ABNORMAL
MCH RBC QN AUTO: 32.2 PG (ref 26.6–33)
MCHC RBC AUTO-ENTMCNC: 34.9 G/DL (ref 31.5–35.7)
MCV RBC AUTO: 92.3 FL (ref 79–97)
MONOCYTES # BLD AUTO: 0.46 10*3/MM3 (ref 0.1–0.9)
MONOCYTES NFR BLD AUTO: 5.8 % (ref 5–12)
NEUTROPHILS # BLD AUTO: 6.59 10*3/MM3 (ref 1.7–7)
NEUTROPHILS NFR BLD AUTO: 83.1 % (ref 42.7–76)
NRBC BLD AUTO-RTO: 0.1 /100 WBC (ref 0–0.2)
PLATELET # BLD AUTO: 305 10*3/MM3 (ref 140–450)
POTASSIUM SERPL-SCNC: 4.3 MMOL/L (ref 3.5–5.2)
PROT SERPL-MCNC: 7.7 G/DL (ref 6–8.5)
RBC # BLD AUTO: 4.04 10*6/MM3 (ref 3.77–5.28)
SODIUM SERPL-SCNC: 137 MMOL/L (ref 136–145)
WBC # BLD AUTO: 7.93 10*3/MM3 (ref 3.4–10.8)

## 2021-03-29 NOTE — TELEPHONE ENCOUNTER
PATIENT CALLED THE REDIRECT LINE AT 4:55 TRYING TO GET X-RAY RESULTS. SHE STATES SHE WAS SUPPOSED TO BE CALLED TODAY, AS THE X-RAYS WERE PERFORMED ON Friday.    SHE WOULD LIKE A CALL BACK ASAP -[phone]

## 2021-03-30 ENCOUNTER — BULK ORDERING (OUTPATIENT)
Dept: CASE MANAGEMENT | Facility: OTHER | Age: 56
End: 2021-03-30

## 2021-03-30 DIAGNOSIS — Z23 IMMUNIZATION DUE: ICD-10-CM

## 2021-07-15 RX ORDER — CYCLOBENZAPRINE HCL 10 MG
10 TABLET ORAL NIGHTLY
Qty: 30 TABLET | Refills: 0 | Status: SHIPPED | OUTPATIENT
Start: 2021-07-15 | End: 2022-06-27 | Stop reason: SDUPTHER

## 2021-08-20 ENCOUNTER — APPOINTMENT (OUTPATIENT)
Dept: WOMENS IMAGING | Facility: HOSPITAL | Age: 56
End: 2021-08-20

## 2021-08-20 PROCEDURE — 77063 BREAST TOMOSYNTHESIS BI: CPT | Performed by: RADIOLOGY

## 2021-08-20 PROCEDURE — 77067 SCR MAMMO BI INCL CAD: CPT | Performed by: RADIOLOGY

## 2021-09-02 ENCOUNTER — TELEPHONE (OUTPATIENT)
Dept: GASTROENTEROLOGY | Facility: CLINIC | Age: 56
End: 2021-09-02

## 2021-09-02 NOTE — TELEPHONE ENCOUNTER
----- Message from Mary Lay sent at 9/1/2021 10:13 AM EDT -----  Regarding: Open Access  Contact: [phone]  Pt needs procedure

## 2021-09-15 NOTE — TELEPHONE ENCOUNTER
Open Access/ Call back  Received: Today  Carolyn Landers RegSched Rep Grubs, Christina, CMA  Phone Number: [phone]   Pt needs procedure       Called and LM for patient to contact office.

## 2021-09-15 NOTE — TELEPHONE ENCOUNTER
Call Back  Received: 5 days ago  Carolyn Landers RegSched Rep Grubs, Christina, CMA  Phone Number: [phone]   Pt returning your call       Called and LM for patient to contact office.

## 2021-10-01 ENCOUNTER — TELEPHONE (OUTPATIENT)
Dept: INTERNAL MEDICINE | Facility: CLINIC | Age: 56
End: 2021-10-01

## 2021-10-11 ENCOUNTER — OFFICE VISIT (OUTPATIENT)
Dept: INTERNAL MEDICINE | Facility: CLINIC | Age: 56
End: 2021-10-11

## 2021-10-11 ENCOUNTER — TELEPHONE (OUTPATIENT)
Dept: GASTROENTEROLOGY | Facility: CLINIC | Age: 56
End: 2021-10-11

## 2021-10-11 VITALS
HEART RATE: 94 BPM | HEIGHT: 64 IN | OXYGEN SATURATION: 98 % | DIASTOLIC BLOOD PRESSURE: 90 MMHG | WEIGHT: 118 LBS | BODY MASS INDEX: 20.14 KG/M2 | SYSTOLIC BLOOD PRESSURE: 128 MMHG

## 2021-10-11 DIAGNOSIS — R10.12 LUQ PAIN: ICD-10-CM

## 2021-10-11 DIAGNOSIS — R10.32 LEFT GROIN PAIN: Primary | ICD-10-CM

## 2021-10-11 DIAGNOSIS — Z98.82 H/O BILATERAL BREAST IMPLANTS: ICD-10-CM

## 2021-10-11 DIAGNOSIS — R10.32 LLQ PAIN: ICD-10-CM

## 2021-10-11 LAB
BILIRUB BLD-MCNC: NEGATIVE MG/DL
CLARITY, POC: CLEAR
COLOR UR: YELLOW
EXPIRATION DATE: NORMAL
GLUCOSE UR STRIP-MCNC: NEGATIVE MG/DL
KETONES UR QL: NEGATIVE
LEUKOCYTE EST, POC: NEGATIVE
Lab: NORMAL
NITRITE UR-MCNC: NEGATIVE MG/ML
PH UR: 6.5 [PH] (ref 5–8)
PROT UR STRIP-MCNC: NEGATIVE MG/DL
RBC # UR STRIP: NEGATIVE /UL
SP GR UR: 1.02 (ref 1–1.03)
UROBILINOGEN UR QL: NORMAL

## 2021-10-11 PROCEDURE — 81003 URINALYSIS AUTO W/O SCOPE: CPT | Performed by: INTERNAL MEDICINE

## 2021-10-11 PROCEDURE — 99213 OFFICE O/P EST LOW 20 MIN: CPT | Performed by: INTERNAL MEDICINE

## 2021-10-11 NOTE — PROGRESS NOTES
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  Going on a couple months.  Intermittent pain.  Notices at rest mainly.  Pain in the left lower groin as well.  Reviewed recent labs from endo which looked overall fine.      Patient requests referral to plastic surgery for consideration of removal of textured implants.      The following portions of the patient's history were reviewed and updated as appropriate: allergies, current medications and problem list.    Patient Active Problem List    Diagnosis Date Noted   • Vitamin D deficiency 01/04/2019   • Mild chronic anemia 01/04/2019     Note Last Updated: 1/4/2019     Lifelong runs 10-11 hemoglobin.      • Other specified hypothyroidism 10/15/2016       Current Outpatient Medications on File Prior to Visit   Medication Sig Dispense Refill   • Calcium Carbonate (CALCIUM 600 PO) Take  by mouth Daily.     • Cholecalciferol (VITAMIN D-3) 1000 UNITS capsule Take  by mouth.     • cyanocobalamin (CYANOCOBALAMIN) 100 MCG tablet tablet Take  by mouth Daily.     • cyclobenzaprine (FLEXERIL) 10 MG tablet TAKE 1 TABLET BY MOUTH EVERY NIGHT 30 tablet 0   • Ferrous Sulfate (IRON) 325 (65 FE) MG tablet Take  by mouth Daily.     • levothyroxine (SYNTHROID, LEVOTHROID) 75 MCG tablet      • MULTIPLE VITAMINS-MINERALS ER PO Take  by mouth Daily.       No current facility-administered medications on file prior to visit.       Objective     /90   Pulse 94   Ht 162.6 cm (64.02\")   Wt 53.5 kg (118 lb)   SpO2 98%   BMI 20.24 kg/m²     Physical Exam  Constitutional:       Appearance: She is well-developed.   HENT:      Head: Normocephalic and atraumatic.   Pulmonary:      Effort: Pulmonary effort is normal.   Abdominal:      General: There is no distension.      Palpations: Abdomen is soft. There is no mass.      Tenderness: There " is abdominal tenderness. There is no guarding or rebound.      Comments: Tender in the LUQ with deep palpation.     Neurological:      Mental Status: She is alert.         Assessment/Plan   Diagnoses and all orders for this visit:    1. Left groin pain (Primary)  -     Urine Culture - Urine, Urine, Catheter  -     POC Urinalysis Dipstick, Automated  -     CT Abdomen Pelvis With Contrast; Future    2. LLQ pain  -     CT Abdomen Pelvis With Contrast; Future    3. LUQ pain  -     CT Abdomen Pelvis With Contrast; Future    4. H/O bilateral breast implants  -     Ambulatory Referral to Plastic Surgery        Discussion    Patient presents with new LUQ and LLQ pain.  Recent blood work all normal.  Urine dip is normal.  Further evaluate pain with CT abd/pelvis.         Future Appointments   Date Time Provider Department Center   1/7/2022  8:00 AM [facility]   1/14/2022  8:15 AM Dasha Celeste MD [facility]

## 2021-10-11 NOTE — TELEPHONE ENCOUNTER
Last scope 11/2015 in epic--No personal history of polyps--Family history of polyps and colon cancer--No blood thinners--Medication:    Calcium Carbonate (CALCIUM 600 PO)    Cholecalciferol (VITAMIN D-3) 1000 UNITS capsule    cyanocobalamin (CYANOCOBALAMIN) 100 MCG tablet tablet    cyclobenzaprine (FLEXERIL) 10 MG tablet    Ferrous Sulfate (IRON) 325 (65 FE) MG tablet    levothyroxine (SYNTHROID, LEVOTHROID) 75 MCG tablet    MULTIPLE VITAMINS-MINERALS ER PO    Oa form scan in media

## 2021-10-13 LAB
BACTERIA UR CULT: NO GROWTH
BACTERIA UR CULT: NORMAL

## 2021-10-18 DIAGNOSIS — Z80.0 FAMILY HISTORY OF COLON CANCER: Primary | ICD-10-CM

## 2021-10-18 RX ORDER — SODIUM CHLORIDE, SODIUM LACTATE, POTASSIUM CHLORIDE, CALCIUM CHLORIDE 600; 310; 30; 20 MG/100ML; MG/100ML; MG/100ML; MG/100ML
30 INJECTION, SOLUTION INTRAVENOUS CONTINUOUS
Status: CANCELLED | OUTPATIENT
Start: 2022-01-14

## 2021-10-20 ENCOUNTER — TELEPHONE (OUTPATIENT)
Dept: GASTROENTEROLOGY | Facility: CLINIC | Age: 56
End: 2021-10-20

## 2021-10-27 NOTE — TELEPHONE ENCOUNTER
Patient called to schedule colonoscopy. Advised her [facility] does not have any availability for 2021. Awaiting return call.

## 2021-10-29 ENCOUNTER — TELEPHONE (OUTPATIENT)
Dept: GASTROENTEROLOGY | Facility: CLINIC | Age: 56
End: 2021-10-29

## 2021-10-29 PROBLEM — Z80.0 FAMILY HISTORY OF COLON CANCER: Status: ACTIVE | Noted: 2021-10-29

## 2021-10-29 NOTE — TELEPHONE ENCOUNTER
Pt is calling you back to schedule her procedure, ask that you leave a message if she doesn't not answer    [phone]

## 2021-11-15 ENCOUNTER — HOSPITAL ENCOUNTER (OUTPATIENT)
Dept: CT IMAGING | Facility: HOSPITAL | Age: 56
Discharge: HOME | End: 2021-11-15
Admitting: INTERNAL MEDICINE

## 2021-11-15 DIAGNOSIS — R10.32 LLQ PAIN: ICD-10-CM

## 2021-11-15 DIAGNOSIS — R10.32 LEFT GROIN PAIN: ICD-10-CM

## 2021-11-15 DIAGNOSIS — R10.12 LUQ PAIN: ICD-10-CM

## 2021-11-15 PROCEDURE — 25010000002 IOPAMIDOL 61 % SOLUTION: Performed by: INTERNAL MEDICINE

## 2021-11-15 PROCEDURE — 0 DIATRIZOATE MEGLUMINE & SODIUM PER 1 ML: Performed by: INTERNAL MEDICINE

## 2021-11-15 PROCEDURE — 74177 CT ABD & PELVIS W/CONTRAST: CPT

## 2021-11-15 RX ADMIN — IOPAMIDOL 85 ML: 612 INJECTION, SOLUTION INTRAVENOUS at 10:45

## 2021-11-15 RX ADMIN — DIATRIZOATE MEGLUMINE AND DIATRIZOATE SODIUM 30 ML: 600; 100 SOLUTION ORAL; RECTAL at 09:40

## 2021-11-23 DIAGNOSIS — D21.9 FIBROID: Primary | ICD-10-CM

## 2021-12-22 ENCOUNTER — ANESTHESIA (OUTPATIENT)
Dept: PAIN MEDICINE | Facility: HOSPITAL | Age: 56
End: 2021-12-22

## 2021-12-22 ENCOUNTER — HOSPITAL ENCOUNTER (OUTPATIENT)
Dept: GENERAL RADIOLOGY | Facility: HOSPITAL | Age: 56
Discharge: HOME | End: 2021-12-22

## 2021-12-22 ENCOUNTER — HOSPITAL ENCOUNTER (OUTPATIENT)
Dept: PAIN MEDICINE | Facility: HOSPITAL | Age: 56
Discharge: HOME | End: 2021-12-22

## 2021-12-22 ENCOUNTER — ANESTHESIA EVENT (OUTPATIENT)
Dept: PAIN MEDICINE | Facility: HOSPITAL | Age: 56
End: 2021-12-22

## 2021-12-22 VITALS
RESPIRATION RATE: 16 BRPM | TEMPERATURE: 97.2 F | DIASTOLIC BLOOD PRESSURE: 81 MMHG | SYSTOLIC BLOOD PRESSURE: 144 MMHG | BODY MASS INDEX: 19.81 KG/M2 | HEIGHT: 64 IN | WEIGHT: 116 LBS | HEART RATE: 59 BPM | OXYGEN SATURATION: 99 %

## 2021-12-22 DIAGNOSIS — M51.26 DISPLACEMENT OF LUMBAR INTERVERTEBRAL DISC WITHOUT MYELOPATHY: Primary | ICD-10-CM

## 2021-12-22 DIAGNOSIS — R52 PAIN: ICD-10-CM

## 2021-12-22 PROCEDURE — 25010000002 METHYLPREDNISOLONE PER 80 MG: Performed by: ANESTHESIOLOGY

## 2021-12-22 PROCEDURE — 0 IOPAMIDOL 41 % SOLUTION: Performed by: ANESTHESIOLOGY

## 2021-12-22 PROCEDURE — 77003 FLUOROGUIDE FOR SPINE INJECT: CPT

## 2021-12-22 RX ORDER — METHYLPREDNISOLONE ACETATE 80 MG/ML
INJECTION, SUSPENSION INTRA-ARTICULAR; INTRALESIONAL; INTRAMUSCULAR; SOFT TISSUE
Status: COMPLETED | OUTPATIENT
Start: 2021-12-22 | End: 2021-12-22

## 2021-12-22 RX ORDER — SODIUM CHLORIDE 0.9 % (FLUSH) 0.9 %
1-10 SYRINGE (ML) INJECTION AS NEEDED
Status: DISCONTINUED | OUTPATIENT
Start: 2021-12-22 | End: 2021-12-23 | Stop reason: HOSPADM

## 2021-12-22 RX ORDER — LIDOCAINE HYDROCHLORIDE 10 MG/ML
1 INJECTION, SOLUTION INFILTRATION; PERINEURAL ONCE AS NEEDED
Status: DISCONTINUED | OUTPATIENT
Start: 2021-12-22 | End: 2021-12-23 | Stop reason: HOSPADM

## 2021-12-22 RX ORDER — METHYLPREDNISOLONE ACETATE 80 MG/ML
80 INJECTION, SUSPENSION INTRA-ARTICULAR; INTRALESIONAL; INTRAMUSCULAR; SOFT TISSUE ONCE
Status: COMPLETED | OUTPATIENT
Start: 2021-12-22 | End: 2021-12-22

## 2021-12-22 RX ORDER — MIDAZOLAM HYDROCHLORIDE 1 MG/ML
1 INJECTION INTRAMUSCULAR; INTRAVENOUS AS NEEDED
Status: DISCONTINUED | OUTPATIENT
Start: 2021-12-22 | End: 2021-12-23 | Stop reason: HOSPADM

## 2021-12-22 RX ORDER — FENTANYL CITRATE 50 UG/ML
50 INJECTION, SOLUTION INTRAMUSCULAR; INTRAVENOUS AS NEEDED
Status: DISCONTINUED | OUTPATIENT
Start: 2021-12-22 | End: 2021-12-23 | Stop reason: HOSPADM

## 2021-12-22 RX ADMIN — METHYLPREDNISOLONE ACETATE 80 MG: 80 INJECTION, SUSPENSION INTRA-ARTICULAR; INTRALESIONAL; INTRAMUSCULAR; SOFT TISSUE at 12:31

## 2021-12-22 RX ADMIN — IOPAMIDOL 10 ML: 408 INJECTION, SOLUTION INTRATHECAL at 12:27

## 2021-12-22 RX ADMIN — METHYLPREDNISOLONE ACETATE 80 MG: 80 INJECTION, SUSPENSION INTRA-ARTICULAR; INTRALESIONAL; INTRAMUSCULAR; SOFT TISSUE at 12:28

## 2021-12-22 NOTE — ANESTHESIA PROCEDURE NOTES
PAIN Epidural block    Pre-sedation assessment completed: 12/22/2021 12:23 PM    Patient reassessed immediately prior to procedure    Patient location during procedure: pain clinic  Start Time: 12/22/2021 12:23 PM  Stop Time: 12/22/2021 12:31 PM  Indication:at surgeon's request and procedure for pain  Performed By  Anesthesiologist: Gato Jimenez MD  Preanesthetic Checklist  Completed: patient identified, site marked, risks and benefits discussed, surgical consent, monitors and equipment checked, pre-op evaluation and timeout performed  Additional Notes  Post-Op Diagnosis Codes:     * Lumbar neuritis (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

## 2021-12-22 NOTE — H&P
[facility]    History and Physical    Patient Name: Esperanza Deutsch DDS  :  1965  MRN:  [id number]  Date of Admission: 2021    Subjective     Patient is a 56 y.o. female presents with chief complaint of chronic low back, hips: left and buttock pain.  Onset of symptoms was gradual starting several years ago.  Symptoms are associated/aggravated by nothing in particular or activity. Symptoms improve with nothing    The following portions of the patients history were reviewed and updated as appropriate: current medications, allergies, past medical history, past surgical history, past family history, past social history and problem list      She has been complaining of pain for approximately 3 years.  The pain goes down her left hip.  She subjectively feels like there is weakness however I am unable to appreciate any obvious weakness in her left leg          Objective     Past Medical History:   Past Medical History:   Diagnosis Date   • Allergic     penicillin   • Allergic reaction    • Anemia    • Disease of thyroid gland     SUBCLINICAL HYPOTHYROIDISM   • Herniated cervical disc    • Hypothyroidism    • Kidney stone    • TMJ (dislocation of temporomandibular joint)    • Whiplash injury      Past Surgical History:   Past Surgical History:   Procedure Laterality Date   • BREAST SURGERY Bilateral     AUGMENTATION   • BREAST SURGERY Left     CYST   • COLONOSCOPY  2015     NO POLPS OR BXS   WNL  DR. BELTRAN   • TONSILLECTOMY AND ADENOIDECTOMY      AT AGE 5     Family History:   Family History   Problem Relation Age of Onset   • Coronary artery disease Other    • Thrombocytopenia Daughter    • Other Father         temporal arteritis   • Cervical cancer Sister    • Seizures Sister         seizures 2019   • Other Paternal Uncle         BRAIN TUMOR   • Osteoporosis Maternal Grandmother    • Aneurysm Maternal Grandfather    • Lung cancer Paternal Grandfather    • Lymphoma Sister    • Testicular  "cancer Paternal Uncle      Social History:   Social History     Socioeconomic History   • Marital status:    Tobacco Use   • Smoking status: Never Smoker   • Smokeless tobacco: Never Used   Substance and Sexual Activity   • Alcohol use: Yes     Alcohol/week: 3.0 standard drinks     Types: 3 Glasses of wine per week     Comment: social   • Drug use: No   • Sexual activity: Yes     Partners: Male     Birth control/protection: Post-menopausal, Other     Comment: CONDOMS       Vital Signs Range for the last 24 hours  Temperature: Temp:  [36.2 °C (97.2 °F)] 36.2 °C (97.2 °F)   Temp Source: Temp src: Infrared   BP: BP: (151)/(88) 151/88   Pulse: Heart Rate:  [72] 72   Respirations: Resp:  [14] 14   SPO2: SpO2:  [99 %] 99 %   O2 Amount (l/min):     O2 Devices Device (Oxygen Therapy): room air   Weight: Weight:  [52.6 kg (116 lb)] 52.6 kg (116 lb)     Flowsheet Rows      First Filed Value   Admission Height 162.6 cm (64\") Documented at 12/22/2021 1150   Admission Weight 52.6 kg (116 lb) Documented at 12/22/2021 1150          --------------------------------------------------------------------------------    Current Outpatient Medications   Medication Sig Dispense Refill   • Calcium Carbonate (CALCIUM 600 PO) Take  by mouth Daily.     • Cholecalciferol (VITAMIN D-3) 1000 UNITS capsule Take  by mouth.     • cyanocobalamin (CYANOCOBALAMIN) 100 MCG tablet tablet Take  by mouth Daily.     • cyclobenzaprine (FLEXERIL) 10 MG tablet TAKE 1 TABLET BY MOUTH EVERY NIGHT 30 tablet 0   • Ferrous Sulfate (IRON) 325 (65 FE) MG tablet Take  by mouth Daily.     • levothyroxine (SYNTHROID, LEVOTHROID) 75 MCG tablet      • MULTIPLE VITAMINS-MINERALS ER PO Take  by mouth Daily.       Current Facility-Administered Medications   Medication Dose Route Frequency Provider Last Rate Last Admin   • fentaNYL citrate (PF) (SUBLIMAZE) injection 50 mcg  50 mcg Intravenous PRN Barbara, Gato Jimenez MD       • iopamidol (ISOVUE-M 200) injection 41%  12 " "mL Epidural Once in imaging Render, Gato Jimenez MD       • lidocaine (XYLOCAINE) 1 % injection 1 mL  1 mL Intradermal Once PRN Render, Gato Jimenez MD       • methylPREDNISolone acetate (DEPO-medrol) injection 80 mg  80 mg Intra-articular Once Render, Gato Jimenez MD       • midazolam (VERSED) injection 1 mg  1 mg Intravenous PRN Render, Gato Jimenez MD       • sodium chloride 0.9 % flush 1-10 mL  1-10 mL Intravenous PRN Gato Jimenez MD           --------------------------------------------------------------------------------  Assessment/Plan      Anesthesia Evaluation           Pain impairs ability to perform ADLs: Exercise/Activity and Working       Airway   Mallampati: I  TM distance: >3 FB  Neck ROM: full  No difficulty expected  Dental - normal exam     Pulmonary - negative pulmonary ROS and normal exam   Cardiovascular     Rhythm: regular    (-) murmur    PE comment: Lower extremities are warm without edema.  Dorsal pedal pulses are palpable    Neuro/Psych- negative ROS  (-) normal sensory deficit, left straight leg raise test, right straight leg raise test  GI/Hepatic/Renal/Endo      Musculoskeletal (-) normal exam    (-)  ALBERT test  Abdominal  - normal exam   Substance History      OB/GYN          Other                   Diagnosis and Plan    Treatment Plan  ASA 1      Procedures: Lumbar Epidural Steroid Injection(LESI), With fluoroscopy,       Anesthetic plan and risks discussed with patient.      Discussed with patient risk and benefits of EDWAR including but not limited to : Bleeding, infection, PDPH, inadvertant spinal anesthetic, worsening pain, hyperglycemia, hypertension, CHF, nerve damage, steroid \"toxicity\" and AVN of hips.  Pt agrees to proceed.    Diagnosis     * Lumbar neuritis [M54.16]     * Lumbar disc displacement without myelopathy [M51.26]                    "

## 2021-12-23 ENCOUNTER — HOSPITAL ENCOUNTER (OUTPATIENT)
Dept: ULTRASOUND IMAGING | Facility: HOSPITAL | Age: 56
Discharge: HOME | End: 2021-12-23
Admitting: INTERNAL MEDICINE

## 2021-12-23 DIAGNOSIS — D21.9 FIBROID: ICD-10-CM

## 2021-12-23 PROCEDURE — 76830 TRANSVAGINAL US NON-OB: CPT

## 2021-12-23 PROCEDURE — 76856 US EXAM PELVIC COMPLETE: CPT

## 2022-01-10 ENCOUNTER — TELEPHONE (OUTPATIENT)
Dept: GASTROENTEROLOGY | Facility: CLINIC | Age: 57
End: 2022-01-10

## 2022-01-12 NOTE — TELEPHONE ENCOUNTER
sent pt instructional packet to [employer]  per her request returned husbands call no answer lm on vm

## 2022-01-12 NOTE — TELEPHONE ENCOUNTER
PT called back requesting that the prep work be sent to [employer]. PT  requesting call back [phone].

## 2022-01-14 ENCOUNTER — HOSPITAL ENCOUNTER (OUTPATIENT)
Facility: HOSPITAL | Age: 57
Setting detail: HOSPITAL OUTPATIENT SURGERY
Discharge: HOME | End: 2022-01-14
Attending: INTERNAL MEDICINE | Admitting: INTERNAL MEDICINE

## 2022-01-14 ENCOUNTER — ANESTHESIA (OUTPATIENT)
Dept: GASTROENTEROLOGY | Facility: HOSPITAL | Age: 57
End: 2022-01-14

## 2022-01-14 ENCOUNTER — ANESTHESIA EVENT (OUTPATIENT)
Dept: GASTROENTEROLOGY | Facility: HOSPITAL | Age: 57
End: 2022-01-14

## 2022-01-14 VITALS
DIASTOLIC BLOOD PRESSURE: 62 MMHG | WEIGHT: 113 LBS | SYSTOLIC BLOOD PRESSURE: 126 MMHG | TEMPERATURE: 97.4 F | RESPIRATION RATE: 12 BRPM | OXYGEN SATURATION: 98 % | HEART RATE: 60 BPM | HEIGHT: 64 IN | BODY MASS INDEX: 19.29 KG/M2

## 2022-01-14 DIAGNOSIS — Z80.0 FAMILY HISTORY OF COLON CANCER: ICD-10-CM

## 2022-01-14 PROCEDURE — 25010000002 PROPOFOL 10 MG/ML EMULSION: Performed by: NURSE ANESTHETIST, CERTIFIED REGISTERED

## 2022-01-14 PROCEDURE — 88305 TISSUE EXAM BY PATHOLOGIST: CPT | Performed by: INTERNAL MEDICINE

## 2022-01-14 PROCEDURE — 45385 COLONOSCOPY W/LESION REMOVAL: CPT | Performed by: INTERNAL MEDICINE

## 2022-01-14 PROCEDURE — S0260 H&P FOR SURGERY: HCPCS | Performed by: INTERNAL MEDICINE

## 2022-01-14 PROCEDURE — 45380 COLONOSCOPY AND BIOPSY: CPT | Performed by: INTERNAL MEDICINE

## 2022-01-14 RX ORDER — PROPOFOL 10 MG/ML
VIAL (ML) INTRAVENOUS CONTINUOUS PRN
Status: DISCONTINUED | OUTPATIENT
Start: 2022-01-14 | End: 2022-01-14 | Stop reason: SURG

## 2022-01-14 RX ORDER — LIDOCAINE HYDROCHLORIDE 20 MG/ML
INJECTION, SOLUTION INFILTRATION; PERINEURAL AS NEEDED
Status: DISCONTINUED | OUTPATIENT
Start: 2022-01-14 | End: 2022-01-14 | Stop reason: SURG

## 2022-01-14 RX ORDER — SODIUM CHLORIDE, SODIUM LACTATE, POTASSIUM CHLORIDE, CALCIUM CHLORIDE 600; 310; 30; 20 MG/100ML; MG/100ML; MG/100ML; MG/100ML
30 INJECTION, SOLUTION INTRAVENOUS CONTINUOUS
Status: DISCONTINUED | OUTPATIENT
Start: 2022-01-14 | End: 2022-01-14 | Stop reason: HOSPADM

## 2022-01-14 RX ORDER — PROPOFOL 10 MG/ML
VIAL (ML) INTRAVENOUS AS NEEDED
Status: DISCONTINUED | OUTPATIENT
Start: 2022-01-14 | End: 2022-01-14 | Stop reason: SURG

## 2022-01-14 RX ADMIN — SODIUM CHLORIDE, POTASSIUM CHLORIDE, SODIUM LACTATE AND CALCIUM CHLORIDE 30 ML/HR: 600; 310; 30; 20 INJECTION, SOLUTION INTRAVENOUS at 07:54

## 2022-01-14 RX ADMIN — LIDOCAINE HYDROCHLORIDE 60 MG: 20 INJECTION, SOLUTION INFILTRATION; PERINEURAL at 08:16

## 2022-01-14 RX ADMIN — PROPOFOL 150 MG: 10 INJECTION, EMULSION INTRAVENOUS at 08:16

## 2022-01-14 RX ADMIN — Medication 140 MCG/KG/MIN: at 08:16

## 2022-01-14 NOTE — H&P
[facility]  Pre Procedure History & Physical    Chief Complaint: Family history of colon polyps, second-degree family member with colon cancer      HPI: 56-year-old woman here for surveillance colonoscopy.  She reports family history of colon polyps and second-degree family members with colon cancer.  She otherwise feels well.    Past Medical History:   Past Medical History:   Diagnosis Date   • Allergic     penicillin   • Allergic reaction    • Anemia    • Disease of thyroid gland     SUBCLINICAL HYPOTHYROIDISM   • Herniated cervical disc    • Hypothyroidism    • Kidney stone 1999   • TMJ (dislocation of temporomandibular joint)    • Whiplash injury        Family History:  Family History   Problem Relation Age of Onset   • Coronary artery disease Other    • Thrombocytopenia Daughter    • Other Father         temporal arteritis   • Cervical cancer Sister    • Seizures Sister         seizures 2019   • Other Paternal Uncle         BRAIN TUMOR   • Osteoporosis Maternal Grandmother    • Aneurysm Maternal Grandfather    • Lung cancer Paternal Grandfather    • Lymphoma Sister    • Testicular cancer Paternal Uncle        Social History:   reports that she has never smoked. She has never used smokeless tobacco. She reports current alcohol use of about 3.0 standard drinks of alcohol per week. She reports that she does not use drugs.    Medications:   Medications Prior to Admission   Medication Sig Dispense Refill Last Dose   • Calcium Carbonate (CALCIUM 600 PO) Take  by mouth Daily.      • Cholecalciferol (VITAMIN D-3) 1000 UNITS capsule Take  by mouth.      • cyanocobalamin (CYANOCOBALAMIN) 100 MCG tablet tablet Take  by mouth Daily.      • cyclobenzaprine (FLEXERIL) 10 MG tablet TAKE 1 TABLET BY MOUTH EVERY NIGHT 30 tablet 0    • Ferrous Sulfate (IRON) 325 (65 FE) MG tablet Take  by mouth Daily.      • levothyroxine (SYNTHROID, LEVOTHROID) 75 MCG tablet       • MULTIPLE VITAMINS-MINERALS ER PO Take   by mouth Daily.          Allergies:  Penicillins    ROS:    Pertinent items are noted in HPI     Objective     There were no vitals taken for this visit.    Physical Exam   Constitutional: Pt is oriented to person, place, and time and well-developed, well-nourished, and in no distress.   HENT:   Mouth/Throat: Oropharynx is clear and moist.   Neck: Normal range of motion. Neck supple.   Cardiovascular: Normal rate, regular rhythm and normal heart sounds.    Pulmonary/Chest: Effort normal and breath sounds normal. No respiratory distress. No  wheezes.   Abdominal: Soft. Bowel sounds are normal.   Skin: Skin is warm and dry.   Psychiatric: Mood, memory, affect and judgment normal.     Assessment/Plan     Diagnosis:  Family history of colon polyps, second-degree family member with colon cancer        Anticipated Surgical Procedure:    Colonoscopy    The risks, benefits, and alternatives of this procedure have been discussed with the patient or the responsible party- the patient understands and agrees to proceed.

## 2022-01-14 NOTE — ANESTHESIA POSTPROCEDURE EVALUATION
"Patient: Esperanza Deutsch DDS    Procedure Summary     Date: 01/14/22 Room / Location: [facility] ENDOSCOPY 9 /  [facility]    Anesthesia Start: 0808 Anesthesia Stop: 0858    Procedure: COLONOSCOPY INTO CECUM AND TI WITH COLD SNARE POLYPECTOMY, COLD BX POLYPECTOMIES (N/A ) Diagnosis:       Family history of colon cancer      (Family history of colon cancer [Z80.0])    Surgeons: Isha Mackay MD Provider: Wallace Toscano MD    Anesthesia Type: MAC ASA Status: 2          Anesthesia Type: MAC    Vitals  Vitals Value Taken Time   /62 01/14/22 0927   Temp     Pulse 60 01/14/22 0927   Resp 12 01/14/22 0927   SpO2 98 % 01/14/22 0927           Post Anesthesia Care and Evaluation    Patient location during evaluation: bedside  Patient participation: complete - patient participated  Level of consciousness: sleepy but conscious  Pain score: 0  Pain management: adequate  Airway patency: patent  Anesthetic complications: No anesthetic complications    Cardiovascular status: acceptable  Respiratory status: acceptable  Hydration status: acceptable    Comments: /62   Pulse 60   Temp 36.3 °C (97.4 °F) (Oral)   Resp 12   Ht 162.6 cm (64\")   Wt 51.3 kg (113 lb)   SpO2 98%   BMI 19.40 kg/m²         "

## 2022-01-14 NOTE — ANESTHESIA PREPROCEDURE EVALUATION
Anesthesia Evaluation     Patient summary reviewed and Nursing notes reviewed   NPO Solid Status: > 8 hours  NPO Liquid Status: > 2 hours           Airway   Mallampati: II  Neck ROM: full  No difficulty expected  Dental - normal exam     Pulmonary     breath sounds clear to auscultation  Cardiovascular     Rhythm: regular        Neuro/Psych  GI/Hepatic/Renal/Endo      Musculoskeletal     Abdominal    Substance History      OB/GYN          Other   blood dyscrasia anemia,                     Anesthesia Plan    ASA 2     MAC     intravenous induction     Anesthetic plan, all risks, benefits, and alternatives have been provided, discussed and informed consent has been obtained with: patient.

## 2022-01-17 LAB
LAB AP CASE REPORT: NORMAL
PATH REPORT.FINAL DX SPEC: NORMAL
PATH REPORT.GROSS SPEC: NORMAL

## 2022-01-17 NOTE — PROGRESS NOTES
Her colon polyps included 1 tubular adenoma and 2 small hyperplastic polypsPlease place in 5-year colonoscopy recall, thanks

## 2022-01-18 ENCOUNTER — TELEPHONE (OUTPATIENT)
Dept: GASTROENTEROLOGY | Facility: CLINIC | Age: 57
End: 2022-01-18

## 2022-01-18 NOTE — TELEPHONE ENCOUNTER
Called pt and left  for pt to call back.     C/s placed in [facility] and  for 01/14/2027.

## 2022-01-18 NOTE — TELEPHONE ENCOUNTER
----- Message from Isha Mackay MD sent at 1/17/2022  4:30 PM EST -----  Her colon polyps included 1 tubular adenoma and 2 small hyperplastic polypsPlease place in 5-year colonoscopy recall, thanks

## 2022-02-11 ENCOUNTER — HOSPITAL ENCOUNTER (OUTPATIENT)
Dept: GENERAL RADIOLOGY | Facility: HOSPITAL | Age: 57
Discharge: HOME | End: 2022-02-11

## 2022-02-11 ENCOUNTER — HOSPITAL ENCOUNTER (OUTPATIENT)
Dept: PAIN MEDICINE | Facility: HOSPITAL | Age: 57
Discharge: HOME | End: 2022-02-11

## 2022-02-11 ENCOUNTER — ANESTHESIA EVENT (OUTPATIENT)
Dept: PAIN MEDICINE | Facility: HOSPITAL | Age: 57
End: 2022-02-11

## 2022-02-11 ENCOUNTER — ANESTHESIA (OUTPATIENT)
Dept: PAIN MEDICINE | Facility: HOSPITAL | Age: 57
End: 2022-02-11

## 2022-02-11 VITALS
DIASTOLIC BLOOD PRESSURE: 60 MMHG | HEART RATE: 66 BPM | TEMPERATURE: 96.6 F | SYSTOLIC BLOOD PRESSURE: 122 MMHG | OXYGEN SATURATION: 98 % | RESPIRATION RATE: 16 BRPM

## 2022-02-11 DIAGNOSIS — R52 PAIN: ICD-10-CM

## 2022-02-11 DIAGNOSIS — M51.26 DISPLACEMENT OF LUMBAR INTERVERTEBRAL DISC WITHOUT MYELOPATHY: ICD-10-CM

## 2022-02-11 PROCEDURE — 25010000002 METHYLPREDNISOLONE PER 80 MG: Performed by: ANESTHESIOLOGY

## 2022-02-11 PROCEDURE — 77003 FLUOROGUIDE FOR SPINE INJECT: CPT

## 2022-02-11 PROCEDURE — 0 IOPAMIDOL 41 % SOLUTION: Performed by: ANESTHESIOLOGY

## 2022-02-11 RX ORDER — SODIUM CHLORIDE 0.9 % (FLUSH) 0.9 %
1-10 SYRINGE (ML) INJECTION AS NEEDED
Status: DISCONTINUED | OUTPATIENT
Start: 2022-02-11 | End: 2022-02-12 | Stop reason: HOSPADM

## 2022-02-11 RX ORDER — MIDAZOLAM HYDROCHLORIDE 1 MG/ML
1 INJECTION INTRAMUSCULAR; INTRAVENOUS AS NEEDED
Status: DISCONTINUED | OUTPATIENT
Start: 2022-02-11 | End: 2022-02-12 | Stop reason: HOSPADM

## 2022-02-11 RX ORDER — FENTANYL CITRATE 50 UG/ML
50 INJECTION, SOLUTION INTRAMUSCULAR; INTRAVENOUS AS NEEDED
Status: DISCONTINUED | OUTPATIENT
Start: 2022-02-11 | End: 2022-02-12 | Stop reason: HOSPADM

## 2022-02-11 RX ORDER — LIDOCAINE HYDROCHLORIDE 10 MG/ML
1 INJECTION, SOLUTION INFILTRATION; PERINEURAL ONCE AS NEEDED
Status: DISCONTINUED | OUTPATIENT
Start: 2022-02-11 | End: 2022-02-12 | Stop reason: HOSPADM

## 2022-02-11 RX ORDER — METHYLPREDNISOLONE ACETATE 80 MG/ML
80 INJECTION, SUSPENSION INTRA-ARTICULAR; INTRALESIONAL; INTRAMUSCULAR; SOFT TISSUE ONCE
Status: COMPLETED | OUTPATIENT
Start: 2022-02-11 | End: 2022-02-11

## 2022-02-11 RX ADMIN — IOPAMIDOL 10 ML: 408 INJECTION, SOLUTION INTRATHECAL at 10:33

## 2022-02-11 RX ADMIN — METHYLPREDNISOLONE ACETATE 80 MG: 80 INJECTION, SUSPENSION INTRA-ARTICULAR; INTRALESIONAL; INTRAMUSCULAR; SOFT TISSUE at 10:33

## 2022-02-11 NOTE — ANESTHESIA PROCEDURE NOTES
PAIN Epidural block    Pre-sedation assessment completed: 2/11/2022 10:29 AM    Patient reassessed immediately prior to procedure    Patient location during procedure: pain clinic  Start Time: 2/11/2022 10:29 AM  Stop Time: 2/11/2022 10:36 AM  Indication:at surgeon's request and procedure for pain  Performed By  Anesthesiologist: Gato Jimenez MD  Preanesthetic Checklist  Completed: patient identified, risks and benefits discussed, surgical consent, monitors and equipment checked, pre-op evaluation and timeout performed  Additional Notes  Post-Op Diagnosis Codes:     * Disc displacement, lumbar (M51.26)     * 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

## 2022-02-11 NOTE — H&P
[facility]    History and Physical    Patient Name: Esperanza Deutsch DDS  :  1965  MRN:  [id number]  Date of Admission: 2022    Subjective     Patient is a 56 y.o. female presents with chief complaint of chronic low back pain.  Onset of symptoms was gradual starting a few months ago.  Symptoms are associated/aggravated by nothing in particular. Symptoms improve with injection    The following portions of the patients history were reviewed and updated as appropriate: current medications, allergies, past medical history, past surgical history, past family history, past social history and problem list      She is elective radicular symptoms into her hips.  She had previous other surgeries at L5-S1 which afforded her probably 70% of her pain.        Objective     Past Medical History:   Past Medical History:   Diagnosis Date   • Allergic     penicillin   • Allergic reaction    • Anemia    • Disease of thyroid gland     SUBCLINICAL HYPOTHYROIDISM   • Herniated cervical disc    • Hypothyroidism    • Kidney stone    • TMJ (dislocation of temporomandibular joint)    • Whiplash injury      Past Surgical History:   Past Surgical History:   Procedure Laterality Date   • BREAST SURGERY Bilateral 2000    AUGMENTATION   • BREAST SURGERY Left     CYST   • COLONOSCOPY  2015     NO POLPS OR BXS   WNL  DR. BELTRAN   • COLONOSCOPY N/A 2022    Procedure: COLONOSCOPY INTO CECUM AND TI WITH COLD SNARE POLYPECTOMY, COLD BX POLYPECTOMIES;  Surgeon: Isha Mackay MD;  Location: [facility] ENDOSCOPY;  Service: Gastroenterology;  Laterality: N/A;  PRE: FAMILY HX OF COLON POLYPS/CANCER  POST: POLYPS, SMALL HEMORRHOIDS   • TONSILLECTOMY AND ADENOIDECTOMY      AT AGE 5     Family History:   Family History   Problem Relation Age of Onset   • Coronary artery disease Other    • Thrombocytopenia Daughter    • Other Father         temporal arteritis   • Cervical cancer Sister    • Seizures Sister         seizures    •  Other Paternal Uncle         BRAIN TUMOR   • Osteoporosis Maternal Grandmother    • Aneurysm Maternal Grandfather    • Lung cancer Paternal Grandfather    • Lymphoma Sister    • Testicular cancer Paternal Uncle      Social History:   Social History     Socioeconomic History   • Marital status:    Tobacco Use   • Smoking status: Never Smoker   • Smokeless tobacco: Never Used   Substance and Sexual Activity   • Alcohol use: Yes     Alcohol/week: 3.0 standard drinks     Types: 3 Glasses of wine per week     Comment: social   • Drug use: No   • Sexual activity: Yes     Partners: Male     Birth control/protection: Post-menopausal, Other     Comment: CONDOMS       Vital Signs Range for the last 24 hours  Temperature: Temp:  [35.9 °C (96.6 °F)] 35.9 °C (96.6 °F)   Temp Source:     BP: BP: (121)/(76) 121/76   Pulse: Heart Rate:  [75] 75   Respirations: Resp:  [16] 16   SPO2: SpO2:  [100 %] 100 %   O2 Amount (l/min):     O2 Devices     Weight:           --------------------------------------------------------------------------------    Current Outpatient Medications   Medication Sig Dispense Refill   • Calcium Carbonate (CALCIUM 600 PO) Take  by mouth Daily.     • Cholecalciferol (VITAMIN D-3) 1000 UNITS capsule Take  by mouth.     • cyanocobalamin (CYANOCOBALAMIN) 100 MCG tablet tablet Take  by mouth Daily.     • cyclobenzaprine (FLEXERIL) 10 MG tablet TAKE 1 TABLET BY MOUTH EVERY NIGHT 30 tablet 0   • Ferrous Sulfate (IRON) 325 (65 FE) MG tablet Take  by mouth Daily.     • levothyroxine (SYNTHROID, LEVOTHROID) 75 MCG tablet      • MULTIPLE VITAMINS-MINERALS ER PO Take  by mouth Daily.       Current Facility-Administered Medications   Medication Dose Route Frequency Provider Last Rate Last Admin   • fentaNYL citrate (PF) (SUBLIMAZE) injection 50 mcg  50 mcg Intravenous PRN Gato Jimenez MD       • iopamidol (ISOVUE-M 200) injection 41%  12 mL Epidural Once in imaging Gato Jimenez MD       • lidocaine  "(XYLOCAINE) 1 % injection 1 mL  1 mL Intradermal Once PRN Render, Gato Jimenez MD       • methylPREDNISolone acetate (DEPO-medrol) injection 80 mg  80 mg Intra-articular Once Render, Gato Jimenez MD       • midazolam (VERSED) injection 1 mg  1 mg Intravenous PRN Render, Gato Jimenez MD       • sodium chloride 0.9 % flush 1-10 mL  1-10 mL Intravenous PRN RenderGato MD           --------------------------------------------------------------------------------  Assessment/Plan      Anesthesia Evaluation                  Airway   Mallampati: II  TM distance: >3 FB  Neck ROM: full  Dental - normal exam     Pulmonary - negative pulmonary ROS   (-) wheezes  Cardiovascular - negative cardio ROS    Rhythm: regular        Neuro/Psych  (-) normal sensory deficit  GI/Hepatic/Renal/Endo    (+)   renal disease,     Musculoskeletal     Abdominal  - normal exam   Substance History      OB/GYN          Other                   Diagnosis and Plan    Treatment Plan  ASA 2   Patient has had previous injection/procedure with % improvement.   Procedures: Lumbar Epidural Steroid Injection(LESI), With fluoroscopy,           Discussed with patient risk and benefits of EDWAR including but not limited to : Bleeding, infection, PDPH, inadvertant spinal anesthetic, worsening pain, hyperglycemia, hypertension, CHF, nerve damage, steroid \"toxicity\" and AVN of hips.  Pt agrees to proceed.    Diagnosis     * Disc displacement, lumbar [M51.26]     * Lumbar neuritis [M54.16]                    "

## 2022-06-27 ENCOUNTER — TELEPHONE (OUTPATIENT)
Dept: INTERNAL MEDICINE | Facility: CLINIC | Age: 57
End: 2022-06-27

## 2022-06-27 RX ORDER — CYCLOBENZAPRINE HCL 10 MG
10 TABLET ORAL NIGHTLY
Qty: 30 TABLET | Refills: 0 | Status: SHIPPED | OUTPATIENT
Start: 2022-06-27

## 2022-06-27 NOTE — TELEPHONE ENCOUNTER
Caller: Esperanza Deutsch DDS    Relationship: Self    Best call back number: [phone]    What medication are you requesting: PREDNISONE    What are your current symptoms: BACK SPASMS DUE TO EXERCISE AND CLEANING    How long have you been experiencing symptoms: ONE DAY    Have you had these symptoms before:    [x] Yes  [] No    Have you been treated for these symptoms before:   [x] Yes  [] No    If a prescription is needed, what is your preferred pharmacy and phone number: [id number] - [place] [address] AT [place] [address] - [phone]  - [phone]     Additional notes:PATIENT IS REQUESTING SOMETHING PRIOR TO HER LEAVING [place] ON Thursday.    PATIENT SWAM LAPS IN HER LAP POOL AND THEN CLEANED THE HOUSE AND HER BACK STARTED HAVING SPASMS.

## 2022-06-27 NOTE — TELEPHONE ENCOUNTER
Caller: Esperanza Deutsch DDS    Relationship: Self    Best call back number: [phone]    Requested Prescriptions:   Requested Prescriptions     Pending Prescriptions Disp Refills   • cyclobenzaprine (FLEXERIL) 10 MG tablet 30 tablet 0     Sig: Take 1 tablet by mouth Every Night.        Pharmacy where request should be sent: [facility] #[id number] [address] AT [place] [address] - [phone]  - [phone] FX     Additional details provided by patient:  PATIENTS BACK IS SORE FROM OVER USE YESTERDAY.    PATIENT REQUESTING A CALL WHEN SENT TO PHARMACY.    Does the patient have less than a 3 day supply:  [x] Yes  [] No    Mary Cartwright Rep   06/27/22 08:36 EDT

## 2022-08-26 ENCOUNTER — APPOINTMENT (OUTPATIENT)
Dept: WOMENS IMAGING | Facility: HOSPITAL | Age: 57
End: 2022-08-26

## 2022-08-26 PROCEDURE — 77067 SCR MAMMO BI INCL CAD: CPT | Performed by: RADIOLOGY

## 2022-08-26 PROCEDURE — 77063 BREAST TOMOSYNTHESIS BI: CPT | Performed by: RADIOLOGY

## 2022-09-08 ENCOUNTER — APPOINTMENT (OUTPATIENT)
Dept: WOMENS IMAGING | Facility: HOSPITAL | Age: 57
End: 2022-09-08

## 2022-09-08 PROCEDURE — 76641 ULTRASOUND BREAST COMPLETE: CPT | Performed by: RADIOLOGY

## 2022-09-08 PROCEDURE — 77061 BREAST TOMOSYNTHESIS UNI: CPT | Performed by: RADIOLOGY

## 2022-09-08 PROCEDURE — 77065 DX MAMMO INCL CAD UNI: CPT | Performed by: RADIOLOGY

## 2022-09-08 PROCEDURE — G0279 TOMOSYNTHESIS, MAMMO: HCPCS | Performed by: RADIOLOGY

## 2022-09-20 ENCOUNTER — DOCUMENTATION (OUTPATIENT)
Dept: SURGERY | Facility: CLINIC | Age: 57
End: 2022-09-20

## 2022-09-20 ENCOUNTER — OFFICE VISIT (OUTPATIENT)
Dept: SURGERY | Facility: CLINIC | Age: 57
End: 2022-09-20

## 2022-09-20 VITALS
OXYGEN SATURATION: 99 % | HEART RATE: 66 BPM | WEIGHT: 114 LBS | BODY MASS INDEX: 18.99 KG/M2 | DIASTOLIC BLOOD PRESSURE: 72 MMHG | HEIGHT: 65 IN | SYSTOLIC BLOOD PRESSURE: 128 MMHG

## 2022-09-20 DIAGNOSIS — R92.8 ABNORMAL FINDING ON BREAST IMAGING: ICD-10-CM

## 2022-09-20 DIAGNOSIS — N60.01 BREAST CYST, RIGHT: Primary | ICD-10-CM

## 2022-09-20 PROCEDURE — 99203 OFFICE O/P NEW LOW 30 MIN: CPT | Performed by: NURSE PRACTITIONER

## 2022-09-20 RX ORDER — FERROUS SULFATE 325(65) MG
1 TABLET ORAL DAILY
COMMUNITY
End: 2023-02-22

## 2022-09-20 RX ORDER — LEVOTHYROXINE SODIUM 0.07 MG/1
1 TABLET ORAL DAILY
COMMUNITY

## 2022-09-20 NOTE — PROGRESS NOTES
[facility]     Referring Provider:      Chief complaint: complicated cyst     HPI: Ms. 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 provider found, for complicated cyst    I personally reviewed her records and summarized her relevant breast history/imagin2021 bilateral screening mammogram with Ousmane at [facility]  breast are extremely dense.  There are bilateral retropectoral saline implants.  No suspicious masses, significant calcifications or other abnormalities are seen.  The implants remain unchanged from the previous study.  Impression  There is no mammographic evidence of malignancy.  BI-RADS 1    2022 bilateral screening mammogram with Ousmane at [facility]  The breasts are extremely dense.  There are bilateral retropectoral saline implants.  There is an asymmetry measuring 5 mm seen in the anterior one third medial region of the right breast located 3 cm from the nipple.  This is seen on the CCID view.  In the left breast, no suspicious masses, significant calcifications or other abnormalities are seen.  Impression  Asymmetry in the right breast requires additional evaluation.  Diagnostic mammogram and complete breast ultrasound is recommended.  Rolled views are recommended.  BI-RADS 0    2022 right breast digital diagnostic mammogram with Ousmane and right breast real-time complete breast ultrasound at [facility]  The breast is extremely dense.  There are bilateral retropectoral saline implants.  Finding 1-additional evaluation was performed for the asymmetry in the right breast, medial seen on 2022.  On the present examination, there is a small, oval mass with obscured margins in the anterior one third region of the right breast at 2:00.  Ultrasound  Finding 1 ultrasound demonstrates an oval small complicated cyst versus solid mass with well-defined, thin margins measuring 3 x 2 x 3 mm in the anterior one third region of the right breast at 2:00.  Finding 2 there are 2 oval  elongated complicated cyst with well-defined, thin margins seen in the upper outer region of the right breast.  No internal vascularity identified by Doppler.  Impression  Finding 1 complicated cyst versus solid mass in the right breast is probably benign.  A follow-up mammogram and ultrasound in 6 months is recommended.  Finding 2 complicated cyst in the upper outer region of the right breast are probably benign.  Follow-up ultrasound of the right breast in 6 months is recommended.  BI-RADS 3      She has a personal history of retro-pectoral saline implants placed 23 years ago.  They were recalled 5 years ago and she is thinking of having them removed.   Left breast cyst aspiration 8 years ago and 9 years ago.     She denies any family history of breast or ovarian cancer.     Today she presents with concerns regarding abnormal imaging and requesting cyst aspiration.  She denies any breast lumps, pain, skin changes, or nipple discharge.  She denies any prior history of abnormal mammograms or breast biopsies.     She was by herself in clinic today.     Review of Systems - Oncology    Medications:    Current Outpatient Medications:   •  Calcium Carbonate (CALCIUM 600 PO), Take  by mouth Daily., Disp: , Rfl:   •  Cholecalciferol (VITAMIN D-3) 1000 UNITS capsule, Take  by mouth., Disp: , Rfl:   •  cyanocobalamin (CYANOCOBALAMIN) 100 MCG tablet tablet, Take  by mouth Daily., Disp: , Rfl:   •  cyclobenzaprine (FLEXERIL) 10 MG tablet, Take 1 tablet by mouth Every Night., Disp: 30 tablet, Rfl: 0  •  Ferrous Sulfate (IRON) 325 (65 FE) MG tablet, Take  by mouth Daily., Disp: , Rfl:   •  ferrous sulfate 325 (65 FE) MG tablet, Take 1 tablet by mouth Daily., Disp: , Rfl:   •  levothyroxine (SYNTHROID, LEVOTHROID) 75 MCG tablet, , Disp: , Rfl:   •  levothyroxine (SYNTHROID, LEVOTHROID) 75 MCG tablet, Take 1 tablet by mouth Daily., Disp: , Rfl:   •  MULTIPLE VITAMINS-MINERALS ER PO, Take  by mouth Daily., Disp: , Rfl:      Allergies:  Allergies   Allergen Reactions   • Penicillins Anaphylaxis       Medical history:  Past Medical History:   Diagnosis Date   • Allergic     penicillin   • Allergic reaction    • Anemia    • Disease of thyroid gland     SUBCLINICAL HYPOTHYROIDISM   • Herniated cervical disc    • Hypothyroidism    • Kidney stone 1999   • TMJ (dislocation of temporomandibular joint)    • Whiplash injury        Surgical History:  Past Surgical History:   Procedure Laterality Date   • BREAST SURGERY Bilateral 2000    AUGMENTATION   • BREAST SURGERY Left     CYST   • COLONOSCOPY  11/20/2015     NO POLPS OR BXS   WNL  DR. BELTRAN   • COLONOSCOPY N/A 1/14/2022    Procedure: COLONOSCOPY INTO CECUM AND TI WITH COLD SNARE POLYPECTOMY, COLD BX POLYPECTOMIES;  Surgeon: Isha Mackay MD;  Location: [facility] ENDOSCOPY;  Service: Gastroenterology;  Laterality: N/A;  PRE: FAMILY HX OF COLON POLYPS/CANCER  POST: POLYPS, SMALL HEMORRHOIDS   • TONSILLECTOMY AND ADENOIDECTOMY      AT AGE 5       Family History:  Family History   Problem Relation Age of Onset   • Coronary artery disease Other    • Thrombocytopenia Daughter    • Other Father         temporal arteritis   • Cervical cancer Sister    • Seizures Sister         seizures 2019   • Other Paternal Uncle         BRAIN TUMOR   • Osteoporosis Maternal Grandmother    • Aneurysm Maternal Grandfather    • Lung cancer Paternal Grandfather    • Lymphoma Sister    • Testicular cancer Paternal Uncle        Social History:   Social History     Socioeconomic History   • Marital status:    Tobacco Use   • Smoking status: Never Smoker   • Smokeless tobacco: Never Used   Substance and Sexual Activity   • Alcohol use: Yes     Alcohol/week: 3.0 standard drinks     Types: 3 Glasses of wine per week     Comment: social   • Drug use: No   • Sexual activity: Yes     Partners: Male     Birth control/protection: Post-menopausal, Other     Comment: CONDOMS     Patient drinks 1 servings of caffeine  per day.       GYNECOLOGIC HISTORY:   G: 3. P: 3. AB: 0.  Last menstrual period: unknown  Age at menarche: 14  Age at first childbirth: 27  Lactation/How lon mons  Age at menopause: 55  Total years of oral contraceptive use: 2  Total years of hormone replacement therapy: 0      Physical Exam  Vitals:    22 1435   BP: 128/72   Pulse: 66   SpO2: 99%     ECOG 0 - Asymptomatic  General: NAD, well appearing  Psych: a&o x 3, normal mood and affect  Eyes: EOMI, no scleral icterus  ENMT: neck supple without masses or thyromegaly, mucus membranes moist  Resp: normal effort, CTAB  CV: RRR, no murmurs, no edema   GI: soft, NT, ND  MSK: normal gait, normal ROM in bilateral shoulders  Lymph nodes:  no cervical, supraclavicular or axillary lymphadenopathy  Breast: symmetric, medium  Right:No visible abnormalities on inspection while seated, with arms raised or hands on hips. No masses, skin changes, or nipple abnormalities. Implant intact  Left:  No visible abnormalities on inspection while seated, with arms raised or hands on hips. No masses, skin changes, or nipple abnormalities. Implant intact      Assessment:    1.  Complicated cyst  2. BIRADS 3    Discussion:  1. I explained the concept of a BI-RADS 3 designation and the process of imaging surveillance. We discussed that a BI-RADS 3 designation describes an imaging finding that is 98-99% likely to represent a benign process. We discussed that the most common management of a BI-RADS 3 finding is initial 6 month imaging surveillance and that the entire period of imaging surveillance can often take 2 years.   2. Recalled implants- discussed having them removed/replaced.  Pt requested names of plastic surgeons.  Information given.  3. Discussed cyst aspiration to be preformed at [facility] and to follow up after.     Plan:  1. Cyst aspiration to be preformed at [facility] on  at 930am followed by dx right mammo, call with results.   2. Cont with monthly breast self  exams  3. Reach out to plastics for Removal of recalled implants  4. rto if any new breast concerns      ERNESTO Jimenez    I have spent 50 min in face to face time with the patient and in chart review.    CC:  No ref. provider found  Dasha Celeste MD    EMR Dragon/transcription disclaimer:  Dictated using Dragon dictation

## 2022-09-28 ENCOUNTER — APPOINTMENT (OUTPATIENT)
Dept: WOMENS IMAGING | Facility: HOSPITAL | Age: 57
End: 2022-09-28

## 2022-09-28 PROCEDURE — 76942 ECHO GUIDE FOR BIOPSY: CPT | Performed by: RADIOLOGY

## 2022-09-28 PROCEDURE — 19001 PUNCTURE ASPIR CYST BRST EA: CPT | Performed by: RADIOLOGY

## 2022-09-28 PROCEDURE — 19000 PUNCTURE ASPIR CYST BREAST: CPT | Performed by: RADIOLOGY

## 2022-10-11 ENCOUNTER — TELEPHONE (OUTPATIENT)
Dept: SURGERY | Facility: CLINIC | Age: 57
End: 2022-10-11

## 2022-10-11 NOTE — TELEPHONE ENCOUNTER
Patient returned phone call.  Discussed with her her BI-RADS 3 and need of doing a follow-up ultrasound and mammogram of the right breast in March 2023.  Patient is in agreement.  We will send a scheduling for them to call her back with appointment times

## 2022-10-12 ENCOUNTER — TELEPHONE (OUTPATIENT)
Dept: SURGERY | Facility: CLINIC | Age: 57
End: 2022-10-12

## 2022-10-12 DIAGNOSIS — R92.8 ABNORMAL FINDING ON BREAST IMAGING: ICD-10-CM

## 2022-10-12 DIAGNOSIS — N60.01 BREAST CYST, RIGHT: Primary | ICD-10-CM

## 2022-10-12 NOTE — TELEPHONE ENCOUNTER
JOSE for call back regarding setting up next imaging and FU with [facility]. Imaging is due on or after 3/28/23.     ----- Message from ERNESTO Jimenez sent at 10/11/2022 12:21 PM EDT -----  Please schedule patient an appointment for a right mammogram and right limited ultrasound in March 2023 at [facility]

## 2022-10-18 ENCOUNTER — TELEPHONE (OUTPATIENT)
Dept: SURGERY | Facility: CLINIC | Age: 57
End: 2022-10-18

## 2022-10-19 ENCOUNTER — TELEPHONE (OUTPATIENT)
Dept: SURGERY | Facility: CLINIC | Age: 57
End: 2022-10-19

## 2022-10-19 NOTE — TELEPHONE ENCOUNTER
Lemuel KILLIAN and [facility] 3/14/23 @ 9:00.   Patient is aware and I emailed her the appt day and time.

## 2023-02-22 ENCOUNTER — OFFICE VISIT (OUTPATIENT)
Dept: INTERNAL MEDICINE | Facility: CLINIC | Age: 58
End: 2023-02-22
Payer: COMMERCIAL

## 2023-02-22 VITALS
HEART RATE: 73 BPM | SYSTOLIC BLOOD PRESSURE: 110 MMHG | DIASTOLIC BLOOD PRESSURE: 80 MMHG | OXYGEN SATURATION: 98 % | HEIGHT: 64 IN | WEIGHT: 116 LBS | BODY MASS INDEX: 19.81 KG/M2

## 2023-02-22 DIAGNOSIS — Z00.00 WELL ADULT EXAM: Primary | ICD-10-CM

## 2023-02-22 DIAGNOSIS — E78.5 HYPERLIPIDEMIA, UNSPECIFIED HYPERLIPIDEMIA TYPE: ICD-10-CM

## 2023-02-22 PROBLEM — N60.01 BREAST CYST, RIGHT: Status: RESOLVED | Noted: 2022-09-20 | Resolved: 2023-02-22

## 2023-02-22 PROBLEM — Z80.0 FAMILY HISTORY OF COLON CANCER: Status: RESOLVED | Noted: 2021-10-29 | Resolved: 2023-02-22

## 2023-02-22 PROBLEM — R92.8 ABNORMAL FINDING ON BREAST IMAGING: Status: RESOLVED | Noted: 2022-09-20 | Resolved: 2023-02-22

## 2023-02-22 LAB
CHOLEST SERPL-MCNC: 188 MG/DL (ref 0–200)
CHOLEST/HDLC SERPL: 2.54 {RATIO}
HDLC SERPL-MCNC: 74 MG/DL (ref 40–60)
LDLC SERPL CALC-MCNC: 104 MG/DL (ref 0–100)
TRIGL SERPL-MCNC: 52 MG/DL (ref 0–150)
VLDLC SERPL CALC-MCNC: 10 MG/DL (ref 5–40)

## 2023-02-22 PROCEDURE — 99396 PREV VISIT EST AGE 40-64: CPT | Performed by: INTERNAL MEDICINE

## 2023-02-22 NOTE — PROGRESS NOTES
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      History of Present Illness     HLD.  Recent check with endo cholesterol was increased.      Review of Systems   Constitutional: Negative.    HENT: Negative.    Eyes: Negative.    Respiratory: Negative.    Cardiovascular: Negative.    Gastrointestinal: Negative.    Endocrine: Negative.    Genitourinary: Negative.    Musculoskeletal: Negative.    Skin: Negative.    Allergic/Immunologic: Negative.    Neurological: Negative.    Hematological: Negative.    Psychiatric/Behavioral: Negative.        The following portions of the patient's history were reviewed and updated as appropriate: allergies, current medications, past family history, past medical history, past social history, past surgical history and problem list.  Health maintenance tab was reviewed and updated with the patient.       Patient Active Problem List    Diagnosis Date Noted   • Vitamin D deficiency 01/04/2019   • Mild chronic anemia 01/04/2019     Note Last Updated: 1/4/2019     Lifelong runs 10-11 hemoglobin.      • Other specified hypothyroidism 10/15/2016       Past Medical History:   Diagnosis Date   • Abnormal finding on breast imaging 9/20/2022   • Allergic     penicillin   • Allergic reaction    • Anemia    • Breast cyst, right 9/20/2022   • Disease of thyroid gland     SUBCLINICAL HYPOTHYROIDISM   • Herniated cervical disc    • Hypothyroidism    • Kidney stone 1999   • TMJ (dislocation of temporomandibular joint)    • Whiplash injury        Past Surgical History:   Procedure Laterality Date   • BREAST SURGERY Bilateral 2000    AUGMENTATION   • BREAST SURGERY Left     CYST   • COLONOSCOPY  11/20/2015     NO POLPS OR BXS   WNL  DR. BELTRAN   • COLONOSCOPY N/A 1/14/2022    Procedure: COLONOSCOPY INTO CECUM AND TI WITH COLD SNARE POLYPECTOMY, COLD BX POLYPECTOMIES;  Surgeon: Isha Mackay MD;  Location: [facility] ENDOSCOPY;  Service: Gastroenterology;   Laterality: N/A;  PRE: FAMILY HX OF COLON POLYPS/CANCER  POST: POLYPS, SMALL HEMORRHOIDS   • TONSILLECTOMY AND ADENOIDECTOMY      AT AGE 5   • US GUIDED CYST ASPIRATION BREAST N/A 9/28/2022       Family History   Problem Relation Age of Onset   • Coronary artery disease Other    • Thrombocytopenia Daughter    • Other Father         temporal arteritis   • Cervical cancer Sister    • Seizures Sister         seizures 2019   • Other Paternal Uncle         BRAIN TUMOR   • Osteoporosis Maternal Grandmother    • Aneurysm Maternal Grandfather    • Lung cancer Paternal Grandfather    • Lymphoma Sister    • Testicular cancer Paternal Uncle        Social History     Socioeconomic History   • Marital status:    Tobacco Use   • Smoking status: Never   • Smokeless tobacco: Never   Substance and Sexual Activity   • Alcohol use: Yes     Alcohol/week: 3.0 standard drinks     Types: 3 Glasses of wine per week     Comment: social   • Drug use: No   • Sexual activity: Yes     Partners: Male     Birth control/protection: Post-menopausal, Other     Comment: CONDOMS       Current Outpatient Medications on File Prior to Visit   Medication Sig Dispense Refill   • Calcium Carbonate (CALCIUM 600 PO) Take  by mouth Daily.     • Cholecalciferol (VITAMIN D-3) 1000 UNITS capsule Take  by mouth.     • cyanocobalamin (CYANOCOBALAMIN) 100 MCG tablet tablet Take  by mouth Daily.     • cyclobenzaprine (FLEXERIL) 10 MG tablet Take 1 tablet by mouth Every Night. 30 tablet 0   • Ferrous Sulfate (IRON) 325 (65 FE) MG tablet Take  by mouth Daily.     • ferrous sulfate 325 (65 FE) MG tablet Take 1 tablet by mouth Daily.     • levothyroxine (SYNTHROID, LEVOTHROID) 75 MCG tablet      • levothyroxine (SYNTHROID, LEVOTHROID) 75 MCG tablet Take 1 tablet by mouth Daily.     • MULTIPLE VITAMINS-MINERALS ER PO Take  by mouth Daily.       No current facility-administered medications on file prior to visit.       Allergies   Allergen Reactions   • Penicillins  "Anaphylaxis       Immunization History   Administered Date(s) Administered   • COVID-19 (PFIZER) PURPLE CAP 02/18/2021, 03/11/2021   • Covid-19 (Pfizer) Gray Cap 05/17/2022   • Flu Vaccine Quad PF 6-35MO 10/01/2018   • Flu Vaccine Quad PF >36MO 10/30/2020   • FluLaval/Fluzone >6mos 10/30/2020   • Flublok 18+yrs 11/22/2019   • Hepatitis A 01/01/2016, 06/01/2016   • Influenza, Unspecified 02/09/2018, 09/01/2018, 11/22/2019   • flucelvax quad pfs =>4 YRS 02/09/2018       Objective     /80   Pulse 73   Ht 163.8 cm (64.49\")   Wt 52.6 kg (116 lb)   SpO2 98%   BMI 19.61 kg/m²     Physical Exam  Constitutional:       Appearance: She is well-developed.   HENT:      Head: Normocephalic and atraumatic.      Right Ear: Hearing, tympanic membrane and external ear normal.      Left Ear: Hearing, tympanic membrane and external ear normal.      Nose: Nose normal.   Neck:      Thyroid: No thyromegaly.   Cardiovascular:      Rate and Rhythm: Normal rate and regular rhythm.      Heart sounds: Normal heart sounds. No murmur heard.  Pulmonary:      Effort: Pulmonary effort is normal.      Breath sounds: Normal breath sounds.   Abdominal:      General: There is no distension.      Palpations: Abdomen is soft.      Tenderness: There is no abdominal tenderness.   Musculoskeletal:      Cervical back: Neck supple.   Lymphadenopathy:      Cervical: No cervical adenopathy.   Skin:     General: Skin is warm and dry.   Neurological:      Mental Status: She is alert and oriented to person, place, and time.   Psychiatric:         Speech: Speech normal.         Behavior: Behavior normal.         Thought Content: Thought content normal.         Judgment: Judgment normal.         Assessment & Plan   Diagnoses and all orders for this visit:    1. Well adult exam (Primary)    2. Hyperlipidemia, unspecified hyperlipidemia type  -     Lipid Panel w/ Chol/HDL Ratio        Discussion    Patient presents today for a CPE.      Patient follows a " healthy diet.   Patient follows an adequate exercise regimen. Mammogram is up to date.   Colon cancer screening is up to date.   Pap smears are performed by the patient's gynecologist.  I have recommended that the patient get the following immunizations:  Shingrix, tdap and COVID-19.      Health Maintenance   Topic Date Due   • TDAP/TD VACCINES (1 - Tdap) Never done   • ZOSTER VACCINE (1 of 2) Never done   • ANNUAL PHYSICAL  01/08/2022   • COVID-19 Vaccine (4 - Booster for Pfizer series) 07/12/2022   • INFLUENZA VACCINE  08/01/2022   • PAP SMEAR  11/01/2022   • MAMMOGRAM  09/08/2024   • COLORECTAL CANCER SCREENING  01/14/2027   • HEPATITIS C SCREENING  Completed   • Pneumococcal Vaccine 0-64  Aged Out            Future Appointments   Date Time Provider Department Center   4/11/2023  9:40 AM Catrachito Soto APRN [facility]

## 2023-03-14 ENCOUNTER — APPOINTMENT (OUTPATIENT)
Dept: WOMENS IMAGING | Facility: HOSPITAL | Age: 58
End: 2023-03-14
Payer: COMMERCIAL

## 2023-03-14 PROCEDURE — 77065 DX MAMMO INCL CAD UNI: CPT | Performed by: RADIOLOGY

## 2023-03-14 PROCEDURE — 76642 ULTRASOUND BREAST LIMITED: CPT | Performed by: RADIOLOGY

## 2023-03-14 PROCEDURE — G0279 TOMOSYNTHESIS, MAMMO: HCPCS | Performed by: RADIOLOGY

## 2023-03-14 PROCEDURE — 77061 BREAST TOMOSYNTHESIS UNI: CPT | Performed by: RADIOLOGY

## 2023-05-08 ENCOUNTER — TELEPHONE (OUTPATIENT)
Dept: SURGERY | Facility: CLINIC | Age: 58
End: 2023-05-08
Payer: COMMERCIAL

## 2023-05-17 ENCOUNTER — TELEPHONE (OUTPATIENT)
Dept: SURGERY | Facility: CLINIC | Age: 58
End: 2023-05-17
Payer: COMMERCIAL

## 2023-05-24 ENCOUNTER — TELEPHONE (OUTPATIENT)
Dept: SURGERY | Facility: CLINIC | Age: 58
End: 2023-05-24
Payer: COMMERCIAL

## 2023-05-31 ENCOUNTER — TELEPHONE (OUTPATIENT)
Dept: SURGERY | Facility: CLINIC | Age: 58
End: 2023-05-31

## 2023-06-06 ENCOUNTER — TELEPHONE (OUTPATIENT)
Dept: SURGERY | Facility: CLINIC | Age: 58
End: 2023-06-06
Payer: COMMERCIAL

## 2023-06-08 ENCOUNTER — TELEPHONE (OUTPATIENT)
Dept: SURGERY | Facility: CLINIC | Age: 58
End: 2023-06-08
Payer: COMMERCIAL

## 2023-06-08 NOTE — TELEPHONE ENCOUNTER
Pt called back to [facility] mike. I scheduled her for 06/22.     Pt stated understanding       Mailed out letter with our information on where we are located.

## 2023-07-10 ENCOUNTER — TELEPHONE (OUTPATIENT)
Dept: INTERNAL MEDICINE | Facility: CLINIC | Age: 58
End: 2023-07-10

## 2023-07-10 NOTE — TELEPHONE ENCOUNTER
CYCLOBENZAPRINE 10MG    Caller: Esperanza Deutsch DDS    Relationship: Self    Best call back number: [phone]     What medication are you requesting: CYCLOBENZAPRINE 10MG    What are your current symptoms: PATIENT HAS BEEN EXERCISING A LOT RECENTLY. WHILE PULLING WEEDS, PATIENT'S BACK WENT INTO A SPASM. SHE'S BEEN TRYING WITH IBUPROFEN, AND DR. ROSENBAUM HAS PRESCRIBED HER THIS MEDICATION IN THE PAST. HER LOWER BACK MUSCLES ARE VERY TIGHT.    How long have you been experiencing symptoms: 7/3/2023    Have you had these symptoms before:    [x] Yes  [] No    Have you been treated for these symptoms before:   [x] Yes  [] No    If a prescription is needed, what is your preferred pharmacy and phone number: [id number] - [place], [place] - [address]  AT [place] [address] - [phone]  - [phone] FX

## 2023-10-12 ENCOUNTER — APPOINTMENT (OUTPATIENT)
Dept: WOMENS IMAGING | Facility: HOSPITAL | Age: 58
End: 2023-10-12
Payer: COMMERCIAL

## 2023-10-12 PROCEDURE — 76642 ULTRASOUND BREAST LIMITED: CPT | Performed by: RADIOLOGY

## 2023-10-12 PROCEDURE — G0279 TOMOSYNTHESIS, MAMMO: HCPCS | Performed by: RADIOLOGY

## 2023-10-12 PROCEDURE — 77066 DX MAMMO INCL CAD BI: CPT | Performed by: RADIOLOGY

## 2023-10-12 PROCEDURE — 77062 BREAST TOMOSYNTHESIS BI: CPT | Performed by: RADIOLOGY

## 2023-10-16 ENCOUNTER — TELEPHONE (OUTPATIENT)
Dept: SURGERY | Facility: CLINIC | Age: 58
End: 2023-10-16
Payer: COMMERCIAL

## 2023-10-16 NOTE — TELEPHONE ENCOUNTER
Pkm to call back to see if pt could come in at 9 tomorrow 10/17 instead of 820 to see chika mcclellan
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
## 2023-10-17 ENCOUNTER — OFFICE VISIT (OUTPATIENT)
Dept: SURGERY | Facility: CLINIC | Age: 58
End: 2023-10-17
Payer: COMMERCIAL

## 2023-10-17 VITALS
HEIGHT: 64 IN | BODY MASS INDEX: 19.81 KG/M2 | DIASTOLIC BLOOD PRESSURE: 80 MMHG | HEART RATE: 62 BPM | SYSTOLIC BLOOD PRESSURE: 122 MMHG | OXYGEN SATURATION: 98 % | WEIGHT: 116 LBS

## 2023-10-17 DIAGNOSIS — N60.01 SOLITARY CYST OF RIGHT BREAST: ICD-10-CM

## 2023-10-17 DIAGNOSIS — R92.8 ABNORMAL FINDING ON BREAST IMAGING: Primary | ICD-10-CM

## 2023-10-17 PROCEDURE — 99213 OFFICE O/P EST LOW 20 MIN: CPT | Performed by: NURSE PRACTITIONER

## 2024-02-17 ENCOUNTER — PATIENT ROUNDING (BHMG ONLY) (OUTPATIENT)
Dept: URGENT CARE | Facility: CLINIC | Age: 59
End: 2024-02-17
Payer: COMMERCIAL

## 2024-02-19 DIAGNOSIS — E03.8 OTHER SPECIFIED HYPOTHYROIDISM: ICD-10-CM

## 2024-02-19 DIAGNOSIS — E55.9 VITAMIN D DEFICIENCY: ICD-10-CM

## 2024-02-19 DIAGNOSIS — Z00.00 HEALTH MAINTENANCE EXAMINATION: Primary | ICD-10-CM

## 2024-02-20 LAB
25(OH)D3+25(OH)D2 SERPL-MCNC: 49.5 NG/ML (ref 30–100)
ALBUMIN SERPL-MCNC: 5 G/DL (ref 3.5–5.2)
ALBUMIN/GLOB SERPL: 2.5 G/DL
ALP SERPL-CCNC: 67 U/L (ref 39–117)
ALT SERPL-CCNC: 21 U/L (ref 1–33)
APPEARANCE UR: CLEAR
AST SERPL-CCNC: 23 U/L (ref 1–32)
BACTERIA #/AREA URNS HPF: ABNORMAL /HPF
BASOPHILS # BLD AUTO: 0.08 10*3/MM3 (ref 0–0.2)
BASOPHILS NFR BLD AUTO: 2.1 % (ref 0–1.5)
BILIRUB SERPL-MCNC: 0.8 MG/DL (ref 0–1.2)
BILIRUB UR QL STRIP: NEGATIVE
BUN SERPL-MCNC: 23 MG/DL (ref 6–20)
BUN/CREAT SERPL: 27.4 (ref 7–25)
CALCIUM SERPL-MCNC: 9.8 MG/DL (ref 8.6–10.5)
CASTS URNS MICRO: ABNORMAL
CHLORIDE SERPL-SCNC: 104 MMOL/L (ref 98–107)
CHOLEST SERPL-MCNC: 198 MG/DL (ref 0–200)
CO2 SERPL-SCNC: 22.8 MMOL/L (ref 22–29)
COLOR UR: YELLOW
CREAT SERPL-MCNC: 0.84 MG/DL (ref 0.57–1)
EGFRCR SERPLBLD CKD-EPI 2021: 80.2 ML/MIN/1.73
EOSINOPHIL # BLD AUTO: 0.22 10*3/MM3 (ref 0–0.4)
EOSINOPHIL NFR BLD AUTO: 5.7 % (ref 0.3–6.2)
EPI CELLS #/AREA URNS HPF: ABNORMAL /HPF
ERYTHROCYTE [DISTWIDTH] IN BLOOD BY AUTOMATED COUNT: 11.9 % (ref 12.3–15.4)
GLOBULIN SER CALC-MCNC: 2 GM/DL
GLUCOSE SERPL-MCNC: 93 MG/DL (ref 65–99)
GLUCOSE UR QL STRIP: NEGATIVE
HCT VFR BLD AUTO: 38.5 % (ref 34–46.6)
HDLC SERPL-MCNC: 65 MG/DL (ref 40–60)
HGB BLD-MCNC: 13 G/DL (ref 12–15.9)
HGB UR QL STRIP: NEGATIVE
IMM GRANULOCYTES # BLD AUTO: 0.01 10*3/MM3 (ref 0–0.05)
IMM GRANULOCYTES NFR BLD AUTO: 0.3 % (ref 0–0.5)
KETONES UR QL STRIP: NEGATIVE
LDLC SERPL CALC-MCNC: 122 MG/DL (ref 0–100)
LEUKOCYTE ESTERASE UR QL STRIP: NEGATIVE
LYMPHOCYTES # BLD AUTO: 1.29 10*3/MM3 (ref 0.7–3.1)
LYMPHOCYTES NFR BLD AUTO: 33.2 % (ref 19.6–45.3)
MCH RBC QN AUTO: 31.4 PG (ref 26.6–33)
MCHC RBC AUTO-ENTMCNC: 33.8 G/DL (ref 31.5–35.7)
MCV RBC AUTO: 93 FL (ref 79–97)
MONOCYTES # BLD AUTO: 0.42 10*3/MM3 (ref 0.1–0.9)
MONOCYTES NFR BLD AUTO: 10.8 % (ref 5–12)
NEUTROPHILS # BLD AUTO: 1.86 10*3/MM3 (ref 1.7–7)
NEUTROPHILS NFR BLD AUTO: 47.9 % (ref 42.7–76)
NITRITE UR QL STRIP: NEGATIVE
NRBC BLD AUTO-RTO: 0 /100 WBC (ref 0–0.2)
PH UR STRIP: 6 [PH] (ref 5–8)
PLATELET # BLD AUTO: 317 10*3/MM3 (ref 140–450)
POTASSIUM SERPL-SCNC: 5.6 MMOL/L (ref 3.5–5.2)
PROT SERPL-MCNC: 7 G/DL (ref 6–8.5)
PROT UR QL STRIP: NEGATIVE
RBC # BLD AUTO: 4.14 10*6/MM3 (ref 3.77–5.28)
RBC #/AREA URNS HPF: ABNORMAL /HPF
SODIUM SERPL-SCNC: 140 MMOL/L (ref 136–145)
SP GR UR STRIP: 1.01 (ref 1–1.03)
T4 FREE SERPL-MCNC: 1.67 NG/DL (ref 0.93–1.7)
TRIGL SERPL-MCNC: 62 MG/DL (ref 0–150)
TSH SERPL DL<=0.005 MIU/L-ACNC: 0.13 UIU/ML (ref 0.27–4.2)
UROBILINOGEN UR STRIP-MCNC: NORMAL MG/DL
VLDLC SERPL CALC-MCNC: 11 MG/DL (ref 5–40)
WBC # BLD AUTO: 3.88 10*3/MM3 (ref 3.4–10.8)
WBC #/AREA URNS HPF: ABNORMAL /HPF

## 2024-02-27 ENCOUNTER — OFFICE VISIT (OUTPATIENT)
Dept: INTERNAL MEDICINE | Facility: CLINIC | Age: 59
End: 2024-02-27
Payer: COMMERCIAL

## 2024-02-27 VITALS
DIASTOLIC BLOOD PRESSURE: 72 MMHG | WEIGHT: 115 LBS | HEART RATE: 63 BPM | SYSTOLIC BLOOD PRESSURE: 114 MMHG | HEIGHT: 64 IN | BODY MASS INDEX: 19.63 KG/M2 | OXYGEN SATURATION: 98 %

## 2024-02-27 DIAGNOSIS — E78.5 HYPERLIPIDEMIA, UNSPECIFIED HYPERLIPIDEMIA TYPE: ICD-10-CM

## 2024-02-27 DIAGNOSIS — R79.89 ABNORMAL CBC: ICD-10-CM

## 2024-02-27 DIAGNOSIS — R79.9 ABNORMAL BLOOD CHEMISTRY: ICD-10-CM

## 2024-02-27 DIAGNOSIS — Z00.00 WELL ADULT EXAM: Primary | ICD-10-CM

## 2024-02-27 RX ORDER — LEVOTHYROXINE SODIUM 0.05 MG/1
TABLET ORAL
COMMUNITY
Start: 2024-02-27

## 2024-02-27 RX ORDER — LEVOTHYROXINE SODIUM 0.05 MG/1
50 TABLET ORAL DAILY
COMMUNITY
End: 2024-02-27 | Stop reason: SDUPTHER

## 2024-02-27 RX ORDER — LEVOTHYROXINE SODIUM 0.07 MG/1
TABLET ORAL
COMMUNITY
Start: 2024-02-27
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
## 2024-04-24 LAB
BASOPHILS # BLD AUTO: 0.08 10*3/MM3 (ref 0–0.2)
BASOPHILS NFR BLD AUTO: 1.6 % (ref 0–1.5)
CHOLEST SERPL-MCNC: 213 MG/DL (ref 0–200)
EOSINOPHIL # BLD AUTO: 0.2 10*3/MM3 (ref 0–0.4)
EOSINOPHIL NFR BLD AUTO: 4.1 % (ref 0.3–6.2)
ERYTHROCYTE [DISTWIDTH] IN BLOOD BY AUTOMATED COUNT: 12.5 % (ref 12.3–15.4)
HCT VFR BLD AUTO: 38.4 % (ref 34–46.6)
HDLC SERPL-MCNC: 79 MG/DL (ref 40–60)
HGB BLD-MCNC: 12.8 G/DL (ref 12–15.9)
IMM GRANULOCYTES # BLD AUTO: 0 10*3/MM3 (ref 0–0.05)
IMM GRANULOCYTES NFR BLD AUTO: 0 % (ref 0–0.5)
LDLC SERPL CALC-MCNC: 125 MG/DL (ref 0–100)
LYMPHOCYTES # BLD AUTO: 1.25 10*3/MM3 (ref 0.7–3.1)
LYMPHOCYTES NFR BLD AUTO: 25.4 % (ref 19.6–45.3)
MCH RBC QN AUTO: 32 PG (ref 26.6–33)
MCHC RBC AUTO-ENTMCNC: 33.3 G/DL (ref 31.5–35.7)
MCV RBC AUTO: 96 FL (ref 79–97)
MONOCYTES # BLD AUTO: 0.4 10*3/MM3 (ref 0.1–0.9)
MONOCYTES NFR BLD AUTO: 8.1 % (ref 5–12)
NEUTROPHILS # BLD AUTO: 2.99 10*3/MM3 (ref 1.7–7)
NEUTROPHILS NFR BLD AUTO: 60.8 % (ref 42.7–76)
NRBC BLD AUTO-RTO: 0 /100 WBC (ref 0–0.2)
PLATELET # BLD AUTO: 317 10*3/MM3 (ref 140–450)
RBC # BLD AUTO: 4 10*6/MM3 (ref 3.77–5.28)
T4 FREE SERPL-MCNC: 1.49 NG/DL (ref 0.93–1.7)
TRIGL SERPL-MCNC: 48 MG/DL (ref 0–150)
TSH SERPL DL<=0.005 MIU/L-ACNC: 2.54 UIU/ML (ref 0.27–4.2)
TSH SERPL DL<=0.005 MIU/L-ACNC: 2.64 UIU/ML (ref 0.27–4.2)
VLDLC SERPL CALC-MCNC: 9 MG/DL (ref 5–40)
WBC # BLD AUTO: 4.92 10*3/MM3 (ref 3.4–10.8)

## 2024-06-25 ENCOUNTER — TELEPHONE (OUTPATIENT)
Dept: INTERNAL MEDICINE | Facility: CLINIC | Age: 59
End: 2024-06-25
Payer: COMMERCIAL

## 2024-06-25 NOTE — TELEPHONE ENCOUNTER
Caller: Esperanza Deutsch DDS    Relationship: Self    Best call back number: [phone]    What medication are you requesting:   PLEASE ADVISE    What are your current symptoms:   LEFT SIDE PAIN/BACK PAIN, PAINFUL URINATION, PRESSURE AND CAN'T EMPTY BLADDER    How long have you been experiencing symptoms: 2-3 DAYS    Have you had these symptoms before:    [] Yes  [x] No    Have you been treated for these symptoms before:   [] Yes  [x] No    If a prescription is needed, what is your preferred pharmacy and phone number: [id number] [place] [id number] AT [place] [address] - [phone]  - [phone] FX     Additional notes:  IS GOING OUT OF TOWN AND WOULD LIKE TO GET THIS BETTER BEFORE SHE LEAVES.     PLEASE CALL TO CONFIRM OR DISCUSS.

## 2024-08-09 ENCOUNTER — OFFICE VISIT (OUTPATIENT)
Dept: INTERNAL MEDICINE | Facility: CLINIC | Age: 59
End: 2024-08-09
Payer: COMMERCIAL

## 2024-08-09 ENCOUNTER — TELEPHONE (OUTPATIENT)
Dept: INTERNAL MEDICINE | Facility: CLINIC | Age: 59
End: 2024-08-09
Payer: COMMERCIAL

## 2024-08-09 VITALS
OXYGEN SATURATION: 97 % | HEIGHT: 64 IN | RESPIRATION RATE: 16 BRPM | DIASTOLIC BLOOD PRESSURE: 60 MMHG | HEART RATE: 70 BPM | BODY MASS INDEX: 19.81 KG/M2 | SYSTOLIC BLOOD PRESSURE: 110 MMHG | WEIGHT: 116 LBS

## 2024-08-09 DIAGNOSIS — M54.50 ACUTE LEFT-SIDED LOW BACK PAIN WITHOUT SCIATICA: Primary | ICD-10-CM

## 2024-08-09 DIAGNOSIS — N39.0 ACUTE UTI: ICD-10-CM

## 2024-08-09 LAB
BILIRUB BLD-MCNC: NEGATIVE MG/DL
CLARITY, POC: CLEAR
COLOR UR: YELLOW
EXPIRATION DATE: ABNORMAL
GLUCOSE UR STRIP-MCNC: NEGATIVE MG/DL
KETONES UR QL: NEGATIVE
LEUKOCYTE EST, POC: NEGATIVE
Lab: ABNORMAL
NITRITE UR-MCNC: POSITIVE MG/ML
PH UR: 6 [PH] (ref 5–8)
PROT UR STRIP-MCNC: NEGATIVE MG/DL
RBC # UR STRIP: ABNORMAL /UL
SP GR UR: 1 (ref 1–1.03)
UROBILINOGEN UR QL: ABNORMAL

## 2024-08-09 PROCEDURE — 99213 OFFICE O/P EST LOW 20 MIN: CPT | Performed by: NURSE PRACTITIONER

## 2024-08-09 PROCEDURE — 81003 URINALYSIS AUTO W/O SCOPE: CPT | Performed by: NURSE PRACTITIONER

## 2024-08-09 RX ORDER — TIZANIDINE HYDROCHLORIDE 2 MG/1
2 CAPSULE, GELATIN COATED ORAL 3 TIMES DAILY
Qty: 30 CAPSULE | Refills: 1 | Status: SHIPPED | OUTPATIENT
Start: 2024-08-09 | End: 2024-08-09

## 2024-08-09 RX ORDER — METHOCARBAMOL 500 MG/1
500 TABLET, FILM COATED ORAL 3 TIMES DAILY PRN
Qty: 30 TABLET | Refills: 0 | Status: SHIPPED | OUTPATIENT
Start: 2024-08-09

## 2024-08-09 RX ORDER — CIPROFLOXACIN 500 MG/1
500 TABLET, FILM COATED ORAL 2 TIMES DAILY
Qty: 10 TABLET | Refills: 0 | Status: SHIPPED | OUTPATIENT
Start: 2024-08-09 | End: 2024-08-14
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
## 2024-08-09 NOTE — TELEPHONE ENCOUNTER
[id number] - [facility] [address] AT [place] [address] - [phone] [facility] [phone] FX    Relationship: Pharmacy    Best call back number: [phone]     What was the call regarding: WARNER STATES THAT THERE IS A DRUG INTERACTION WITH THE CIRPO MEDICATION AND TIZANIDINE. SHE STATES THE TIZANIDINE INCREASES THE EFFECTS OF CIPRO SO SHE IS NOT SURE IF THE PROVIDER IS WANTING TO CHANGE ONE OF THE MEDICATIONS

## 2024-08-11 LAB
BACTERIA UR CULT: NO GROWTH
BACTERIA UR CULT: NORMAL

## 2024-08-12 ENCOUNTER — TELEPHONE (OUTPATIENT)
Dept: INTERNAL MEDICINE | Facility: CLINIC | Age: 59
End: 2024-08-12
Payer: COMMERCIAL

## 2024-08-12 NOTE — TELEPHONE ENCOUNTER
Caller: Esperanza Deutsch DDS    Relationship: Self    Best call back number:     Caller requesting test results:     What test was performed: URINE TEST    When was the test performed: 08/09/24    Where was the test performed: DR ROSENBAUM OFFICE    Additional notes: PATIENT IS CALLING IN TO ASK IF DR ROSENBAUM HAS HER URINE TEST RESULTS IN YET AS SHE WANTS TO DISCUSS THEM BEFORE SHE GOES OUT OF TOWN ON 09/13/24 AND WANTS TO DISCUSS IF SHE NEEDS MEDICATIONS OR NOT.

## 2024-08-13 RX ORDER — CIPROFLOXACIN 500 MG/1
500 TABLET, FILM COATED ORAL 2 TIMES DAILY
Qty: 10 TABLET | Refills: 0 | OUTPATIENT
Start: 2024-08-13 | End: 2024-08-18

## 2024-08-14 RX ORDER — CIPROFLOXACIN 500 MG/1
TABLET, FILM COATED ORAL
Qty: 10 TABLET | Refills: 0 | OUTPATIENT
Start: 2024-08-14

## 2024-08-14 NOTE — TELEPHONE ENCOUNTER
CALLED AND SPOKE WITH PATIENT.   RX WAS DENIED YESTERDAY AS HER UA WAS CLEAR, VOICEMAIL WAS LEFT MONDAY WITH HER RESULTS.   SHE UNDERSTOOD

## 2024-08-14 NOTE — TELEPHONE ENCOUNTER
PATIENT IS CALLING TO CHECK ON THE STATUS OF THIS. PLEASE LET PATIENT WHEN THIS HAS BEEN SENT TO THE [facility]. SHE NEEDS THIS SENT IN ASAP. SHE WOULD LIKE TO PICK THIS UP WITHIN THE NEXT HOUR.

## 2025-02-25 DIAGNOSIS — E78.5 HYPERLIPIDEMIA, UNSPECIFIED HYPERLIPIDEMIA TYPE: Primary | ICD-10-CM

## 2025-02-25 DIAGNOSIS — E55.9 VITAMIN D DEFICIENCY: ICD-10-CM

## 2025-02-25 DIAGNOSIS — Z00.00 HEALTH MAINTENANCE EXAMINATION: ICD-10-CM

## 2025-02-25 LAB
25(OH)D3+25(OH)D2 SERPL-MCNC: 36.4 NG/ML (ref 30–100)
ALBUMIN SERPL-MCNC: 4.3 G/DL (ref 3.5–5.2)
ALBUMIN/GLOB SERPL: 1.8 G/DL
ALP SERPL-CCNC: 59 U/L (ref 39–117)
ALT SERPL-CCNC: 22 U/L (ref 1–33)
APPEARANCE UR: CLEAR
AST SERPL-CCNC: 21 U/L (ref 1–32)
BACTERIA #/AREA URNS HPF: NORMAL /HPF
BASOPHILS # BLD AUTO: 0.08 10*3/MM3 (ref 0–0.2)
BASOPHILS NFR BLD AUTO: 1.9 % (ref 0–1.5)
BILIRUB SERPL-MCNC: 0.6 MG/DL (ref 0–1.2)
BILIRUB UR QL STRIP: NEGATIVE
BUN SERPL-MCNC: 19 MG/DL (ref 8–23)
BUN/CREAT SERPL: 23.2 (ref 7–25)
CALCIUM SERPL-MCNC: 9.4 MG/DL (ref 8.6–10.5)
CASTS URNS MICRO: NORMAL
CHLORIDE SERPL-SCNC: 106 MMOL/L (ref 98–107)
CHOLEST SERPL-MCNC: 199 MG/DL (ref 0–200)
CO2 SERPL-SCNC: 23 MMOL/L (ref 22–29)
COLOR UR: YELLOW
CREAT SERPL-MCNC: 0.82 MG/DL (ref 0.57–1)
EGFRCR SERPLBLD CKD-EPI 2021: 82 ML/MIN/1.73
EOSINOPHIL # BLD AUTO: 0.25 10*3/MM3 (ref 0–0.4)
EOSINOPHIL NFR BLD AUTO: 6 % (ref 0.3–6.2)
EPI CELLS #/AREA URNS HPF: NORMAL /HPF
ERYTHROCYTE [DISTWIDTH] IN BLOOD BY AUTOMATED COUNT: 11.8 % (ref 12.3–15.4)
GLOBULIN SER CALC-MCNC: 2.4 GM/DL
GLUCOSE SERPL-MCNC: 94 MG/DL (ref 65–99)
GLUCOSE UR QL STRIP: NEGATIVE
HCT VFR BLD AUTO: 36.6 % (ref 34–46.6)
HDLC SERPL-MCNC: 62 MG/DL (ref 40–60)
HGB BLD-MCNC: 12.2 G/DL (ref 12–15.9)
HGB UR QL STRIP: NEGATIVE
IMM GRANULOCYTES # BLD AUTO: 0 10*3/MM3 (ref 0–0.05)
IMM GRANULOCYTES NFR BLD AUTO: 0 % (ref 0–0.5)
KETONES UR QL STRIP: NEGATIVE
LDLC SERPL CALC-MCNC: 127 MG/DL (ref 0–100)
LEUKOCYTE ESTERASE UR QL STRIP: NEGATIVE
LYMPHOCYTES # BLD AUTO: 1.23 10*3/MM3 (ref 0.7–3.1)
LYMPHOCYTES NFR BLD AUTO: 29.6 % (ref 19.6–45.3)
MCH RBC QN AUTO: 31.4 PG (ref 26.6–33)
MCHC RBC AUTO-ENTMCNC: 33.3 G/DL (ref 31.5–35.7)
MCV RBC AUTO: 94.1 FL (ref 79–97)
MONOCYTES # BLD AUTO: 0.4 10*3/MM3 (ref 0.1–0.9)
MONOCYTES NFR BLD AUTO: 9.6 % (ref 5–12)
NEUTROPHILS # BLD AUTO: 2.2 10*3/MM3 (ref 1.7–7)
NEUTROPHILS NFR BLD AUTO: 52.9 % (ref 42.7–76)
NITRITE UR QL STRIP: NEGATIVE
NRBC BLD AUTO-RTO: 0 /100 WBC (ref 0–0.2)
PH UR STRIP: 6 [PH] (ref 5–8)
PLATELET # BLD AUTO: 292 10*3/MM3 (ref 140–450)
POTASSIUM SERPL-SCNC: 5.6 MMOL/L (ref 3.5–5.2)
PROT SERPL-MCNC: 6.7 G/DL (ref 6–8.5)
PROT UR QL STRIP: NEGATIVE
RBC # BLD AUTO: 3.89 10*6/MM3 (ref 3.77–5.28)
RBC #/AREA URNS HPF: NORMAL /HPF
SODIUM SERPL-SCNC: 140 MMOL/L (ref 136–145)
SP GR UR STRIP: 1.01 (ref 1–1.03)
TRIGL SERPL-MCNC: 52 MG/DL (ref 0–150)
TSH SERPL DL<=0.005 MIU/L-ACNC: 1.98 UIU/ML (ref 0.27–4.2)
UROBILINOGEN UR STRIP-MCNC: NORMAL MG/DL
VLDLC SERPL CALC-MCNC: 10 MG/DL (ref 5–40)
WBC # BLD AUTO: 4.16 10*3/MM3 (ref 3.4–10.8)
WBC #/AREA URNS HPF: NORMAL /HPF

## 2025-03-03 ENCOUNTER — OFFICE VISIT (OUTPATIENT)
Dept: INTERNAL MEDICINE | Facility: CLINIC | Age: 60
End: 2025-03-03
Payer: COMMERCIAL

## 2025-03-03 VITALS
DIASTOLIC BLOOD PRESSURE: 74 MMHG | SYSTOLIC BLOOD PRESSURE: 130 MMHG | BODY MASS INDEX: 20.14 KG/M2 | OXYGEN SATURATION: 98 % | WEIGHT: 118 LBS | HEIGHT: 64 IN | HEART RATE: 63 BPM

## 2025-03-03 DIAGNOSIS — Z00.00 WELL ADULT EXAM: Primary | ICD-10-CM

## 2025-03-03 PROBLEM — R92.8 ABNORMAL FINDING ON BREAST IMAGING: Status: RESOLVED | Noted: 2022-09-20 | Resolved: 2025-03-03

## 2025-03-03 PROCEDURE — 99396 PREV VISIT EST AGE 40-64: CPT | Performed by: INTERNAL MEDICINE
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
## 2025-03-26 ENCOUNTER — TELEPHONE (OUTPATIENT)
Dept: INTERNAL MEDICINE | Facility: CLINIC | Age: 60
End: 2025-03-26
Payer: COMMERCIAL

## 2025-03-26 RX ORDER — METHOCARBAMOL 500 MG/1
500 TABLET, FILM COATED ORAL 3 TIMES DAILY PRN
Qty: 30 TABLET | Refills: 0 | Status: SHIPPED | OUTPATIENT
Start: 2025-03-26

## 2025-03-26 NOTE — TELEPHONE ENCOUNTER
Caller: Esperanza Deutsch DDS    Relationship: Self    Best call back number: [phone]     What medication are you requesting: MUSCLE RELAXER     What are your current symptoms: BACK ,HIP AND LEG PAIN    If a prescription is needed, what is your preferred pharmacy and phone number: [id number] - [place], [place] - [address] AT [place] [address] [facility] - [phone] [facility] [phone] FX     Additional notes: SAYS SHE OVER DONE WORKING OUT AND LIFTING WEIGHTS AND NOW IS HURTING ASKING FOR A MUSCLE RELAXER WENT TO SEE ORTHOPEDICS ON MONDAY AND WAS PLACED ON PREDNISONE BUT ASKING FOR A MUSCLE RELAXER TO HELP ALONG PLEASE CALL A [facility]

## 2025-03-28 ENCOUNTER — OFFICE VISIT (OUTPATIENT)
Dept: INTERNAL MEDICINE | Facility: CLINIC | Age: 60
End: 2025-03-28
Payer: COMMERCIAL

## 2025-03-28 VITALS
WEIGHT: 118 LBS | OXYGEN SATURATION: 98 % | SYSTOLIC BLOOD PRESSURE: 140 MMHG | BODY MASS INDEX: 20.14 KG/M2 | HEART RATE: 64 BPM | HEIGHT: 64 IN | DIASTOLIC BLOOD PRESSURE: 88 MMHG

## 2025-03-28 DIAGNOSIS — M54.42 ACUTE LEFT-SIDED LOW BACK PAIN WITH LEFT-SIDED SCIATICA: Primary | ICD-10-CM

## 2025-03-28 DIAGNOSIS — K40.90 LEFT INGUINAL HERNIA: ICD-10-CM

## 2025-03-28 PROCEDURE — 99213 OFFICE O/P EST LOW 20 MIN: CPT | Performed by: INTERNAL MEDICINE

## 2025-03-28 RX ORDER — MELOXICAM 15 MG/1
15 TABLET ORAL DAILY
Qty: 30 TABLET | Refills: 0 | Status: SHIPPED | OUTPATIENT
Start: 2025-03-28

## 2025-03-28 NOTE — PROGRESS NOTES
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  Going on a week.  Started four hours after exercise.  Radiates to the bilateral hips and left leg.   Ibuprofen little help.  She saw orthopedic and was given a medrol dose lev.  I sent in muscle relaxer and she is somewhat improved.     Felt a bulge in the groin of the left.  Mild pain.  Only with standing up.      Review of Systems   Respiratory: Negative.     Cardiovascular: Negative.        The following portions of the patient's history were reviewed and updated as appropriate: allergies, current medications and problem list.    Patient Active Problem List    Diagnosis Date Noted    Hyperlipidemia 02/22/2023    Solitary cyst of right breast 09/20/2022    Vitamin D deficiency 01/04/2019    Mild chronic anemia 01/04/2019     Note Last Updated: 1/4/2019     Lifelong runs 10-11 hemoglobin.       Other specified hypothyroidism 10/15/2016       Current Outpatient Medications on File Prior to Visit   Medication Sig Dispense Refill    Calcium Carbonate (CALCIUM 600 PO) Take  by mouth Daily.      Cholecalciferol (VITAMIN D-3) 1000 UNITS capsule Take  by mouth.      cyanocobalamin (CYANOCOBALAMIN) 100 MCG tablet tablet Take  by mouth Daily.      Ferrous Sulfate (IRON) 325 (65 FE) MG tablet Take  by mouth Daily.      levothyroxine (SYNTHROID, LEVOTHROID) 50 MCG tablet       levothyroxine (SYNTHROID, LEVOTHROID) 75 MCG tablet Three times weekly      methocarbamol (ROBAXIN) 500 MG tablet Take 1 tablet by mouth 3 (Three) Times a Day As Needed for Muscle Spasms. 30 tablet 0    MULTIPLE VITAMINS-MINERALS ER PO Take  by mouth Daily.       No current facility-administered medications on file prior to visit.       Objective     /88   Pulse 64   Ht 163.8 cm (64.49\")   Wt 53.5 kg (118 lb)   SpO2 98%   BMI 19.95 kg/m²     Physical " Exam  Constitutional:       Appearance: She is well-developed.   HENT:      Head: Normocephalic and atraumatic.   Pulmonary:      Effort: Pulmonary effort is normal.   Abdominal:      Hernia: Hernia is present in the left inguinal area.      Comments: Left inguinal hernia which is reducible.     Neurological:      Mental Status: She is alert and oriented to person, place, and time.   Psychiatric:         Behavior: Behavior normal.         Assessment & Plan   Diagnoses and all orders for this visit:    1. Acute left-sided low back pain with left-sided sciatica (Primary)    2. Left inguinal hernia  -     Ambulatory Referral to General Surgery    Other orders  -     meloxicam (MOBIC) 15 MG tablet; Take 1 tablet by mouth Daily.  Dispense: 30 tablet; Refill: 0        Discussion    Patient presents with:  Acute left sided low back pain.  I discussed with the patient a trial of conservative management with:   NSAIDs, physical therapy, and muscle relaxer.  Let me know if not feeling better over the next several weeks or if there is any change in symptoms.  Left inguinal hernia.  New.  Reducible.  Refer to general surgery for opinion.         Future Appointments   Date Time Provider Department Center   2/27/2026  8:20 AM [facility]   3/6/2026  8:15 AM Dasha Celeste MD [facility]

## 2025-04-03 ENCOUNTER — OFFICE VISIT (OUTPATIENT)
Dept: SURGERY | Facility: CLINIC | Age: 60
End: 2025-04-03
Payer: COMMERCIAL

## 2025-04-03 VITALS
HEIGHT: 65 IN | OXYGEN SATURATION: 99 % | SYSTOLIC BLOOD PRESSURE: 135 MMHG | DIASTOLIC BLOOD PRESSURE: 80 MMHG | TEMPERATURE: 97.6 F | HEART RATE: 77 BPM | BODY MASS INDEX: 19.46 KG/M2 | WEIGHT: 116.8 LBS

## 2025-04-03 DIAGNOSIS — R10.32 LEFT GROIN PAIN: Primary | ICD-10-CM

## 2025-04-03 NOTE — PROGRESS NOTES
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 She has a history of low back and left leg pain and has been treated for sciatica in the past. She is very active and has been exercising more frequently in the past month. She states that about two weeks ago after an intense elliptical and weight lifting session, she noted severe back pain and left leg and knee pain for which she was started on steroids and muscle relaxants. A few days later while showering she noted an asymmetry while looking down and felt a bulge in her left side. She pressed on it and notes that as she palpated a focal area just medial to her ASIS, she had severe electric shooting pains down her thigh to her knee. She states this pain is reproducible with palpation.  She was seen her PCP and was noted to have a reducible left inguinal hernia and referred for evaluation. The patient denies associated nausea, vomiting, abdominal distension, pain, irreducible bulge, constipation, or diarrhea. She states she feels her bulge has gone down now and is no longer present.     Past Medical History:   Anemia  Colon polyps  Hypothyroidism  Low back pain  Sciatica  Kidney stone     Past Surgical History:    Breast cyst aspiration  Breast augmentation with revision  Colonoscopy  Tonsillectomy/adenoidectomy    Family History:    Family History   Problem Relation Age of Onset    Rheum arthritis Mother     Cervical cancer Sister     Seizures Sister         seizures 2019    Lymphoma Sister     Hypothyroidism Sister     Cancer Sister         Non hodgkins lymphoma    Osteoporosis Maternal Grandmother     Aneurysm Maternal Grandfather     Lung cancer Paternal Grandfather     Thrombocytopenia Daughter     Testicular cancer Paternal Uncle     Coronary artery disease Other         Social History:    Social History     Socioeconomic History    Marital status:    Tobacco Use    Smoking status: Never     Passive exposure: Never    Smokeless tobacco: Never   Vaping Use    Vaping status: Never Used   Substance and Sexual Activity    Alcohol use: Yes     Alcohol/week: 3.0 standard drinks of alcohol     Types: 3 Glasses of wine per week     Comment: social    Drug use: No    Sexual activity: Yes     Partners: Male     Birth control/protection: Other, Post-menopausal     Comment: CONDOMS     Allergies:   Allergies   Allergen Reactions    Penicillins Anaphylaxis     Beta lactam allergy details  Antibiotic reaction:  (anaphylaxis)  Age at reaction: child  Dose to reaction time: (!) minutes  Reason for antibiotic: unknown  Epinephrine required for reaction?: unknown  Tolerated antibiotics: amoxicillin          Medications:     Current Outpatient Medications:     Calcium Carbonate (CALCIUM 600 PO), Take  by mouth Daily., Disp: , Rfl:     Cholecalciferol (VITAMIN D-3) 1000 UNITS capsule, Take  by mouth., Disp: , Rfl:     cyanocobalamin (CYANOCOBALAMIN) 100 MCG tablet tablet, Take  by mouth Daily., Disp: , Rfl:     Ferrous Sulfate (IRON) 325 (65 FE) MG tablet, Take  by mouth Daily., Disp: , Rfl:     levothyroxine (SYNTHROID, LEVOTHROID) 50 MCG tablet, , Disp: , Rfl:     levothyroxine (SYNTHROID, LEVOTHROID) 75 MCG tablet, Three times weekly, Disp: , Rfl:     meloxicam (MOBIC) 15 MG tablet, Take 1 tablet by mouth Daily., Disp: 30 tablet, Rfl: 0    methocarbamol (ROBAXIN) 500 MG tablet, Take 1 tablet by mouth 3 (Three) Times a Day As Needed for Muscle Spasms., Disp: 30 tablet, Rfl: 0    MULTIPLE VITAMINS-MINERALS ER PO, Take  by mouth Daily., Disp: , Rfl:     Review of Systems:   Constitutional: denies fevers, chills  Resp: denies cough, shortness of breath  CV: denies chest pain, heart palpitations  GI: denies abdominal pain, nausea, vomiting, diarrhea, constipation, melena, hematochezia  :  "denies hematuria, dysuria  Skin: denies rash    Physical Exam:   /80   Pulse 77   Temp 97.6 °F (36.4 °C)   Ht 163.8 cm (64.5\")   Wt 53 kg (116 lb 12.8 oz)   SpO2 99%   BMI 19.74 kg/m²   Constitutional: Well-developed, well-nourished  Respiratory: Normal inspiratory effort on room air  Cardiovascular: Well perfused, no peripheral edema  Gastrointestinal: Abd soft, nontender, nondistended, no rebound or guarding; no palpable bulge or clear hernias on exam; she has reproducible pain in her left thigh and knee with palpation of her abdomen just medial to her ASIS  Lymphatics: No palpable lymphadenopathy  Musculoskeletal: Symmetric strength  Psychiatric: Normal mood and affect  Skin:  Warm, dry, no rash on visualized skin surfaces    Radiology/Endoscopy:    CT abd/pelvis 10/11/2021 images and report reviewed-  no inguinal hernia noted on this scan    Labs:    CBC 2/25/25 - WBC 4.16, Hgb 12.2, Hct 36.6, Plt 292    CMP:  Lab Results   Component Value Date    GLUCOSE 94 02/25/2025    BUN 19 02/25/2025    CREATININE 0.82 02/25/2025     02/25/2025    K 5.6 (H) 02/25/2025     02/25/2025    CALCIUM 9.4 02/25/2025    PROTEINTOT 6.7 02/25/2025    ALBUMIN 4.3 02/25/2025    ALT 22 02/25/2025    AST 21 02/25/2025    ALKPHOS 59 02/25/2025    BILITOT 0.6 02/25/2025    GLOB 2.4 02/25/2025    AGRATIO 1.8 02/25/2025    BCR 23.2 02/25/2025    ANIONGAP 13.7 05/20/2017    EGFR 82.0 02/25/2025       Assessment/Plan:  59 year old lady presenting with left hip and groin pain for evaluation for left inguinal hernia. Initially reported a bulge after strenuous exercise that has now resolved. She has no palpable hernia on exam today and has not had symptoms concerning for incarceration/strangulation. Her pain seems to be more musculoskeletal given her improvement with muscle relaxer and steroids. I discussed with her management options including observation vs CT imaging to further evaluate and if an occult hernia is " identified possible surgery. The patient wishes to proceed with further workup. CT ordered. I will call her with the results and further management recommendations. Discussed with her signs/symptoms to monitor for hernia incarceration/strangulation and need to call the office or return to ER. Patient agreeable with recommendations.    Ofelia Washburn M.D.  General, Robotic, and Endoscopic Surgery  [facility]    [address], [address]  [place], [place], [address]  P: [phone]  F: [phone]

## 2025-04-15 ENCOUNTER — HOSPITAL ENCOUNTER (OUTPATIENT)
Dept: PET IMAGING | Facility: HOSPITAL | Age: 60
Discharge: HOME | End: 2025-04-15
Admitting: STUDENT IN AN ORGANIZED HEALTH CARE EDUCATION/TRAINING PROGRAM
Payer: COMMERCIAL

## 2025-04-15 DIAGNOSIS — R10.32 LEFT GROIN PAIN: ICD-10-CM

## 2025-04-15 PROCEDURE — 74176 CT ABD & PELVIS W/O CONTRAST: CPT

## 2025-04-21 ENCOUNTER — TELEPHONE (OUTPATIENT)
Dept: SURGERY | Facility: CLINIC | Age: 60
End: 2025-04-21
Payer: COMMERCIAL

## 2025-04-24 RX ORDER — MELOXICAM 15 MG/1
15 TABLET ORAL DAILY
Qty: 30 TABLET | Refills: 0 | Status: SHIPPED | OUTPATIENT
Start: 2025-04-24

## 2025-06-16 ENCOUNTER — APPOINTMENT (OUTPATIENT)
Dept: WOMENS IMAGING | Facility: HOSPITAL | Age: 60
End: 2025-06-16
Payer: COMMERCIAL

## 2025-06-16 PROCEDURE — 77066 DX MAMMO INCL CAD BI: CPT | Performed by: RADIOLOGY

## 2025-06-16 PROCEDURE — G0279 TOMOSYNTHESIS, MAMMO: HCPCS | Performed by: RADIOLOGY

## 2025-06-16 PROCEDURE — 76642 ULTRASOUND BREAST LIMITED: CPT | Performed by: RADIOLOGY

## 2025-06-16 PROCEDURE — 77062 BREAST TOMOSYNTHESIS BI: CPT | Performed by: RADIOLOGY

## (undated) DEVICE — THE SINGLE USE ETRAP – POLYP TRAP IS USED FOR SUCTION RETRIEVAL OF ENDOSCOPICALLY REMOVED POLYPS.: Brand: ETRAP

## (undated) DEVICE — SNAR POLYP CAPTIVATOR RND STFF 2.4 240CM 10MM 1P/U

## (undated) DEVICE — SENSR O2 OXIMAX FNGR A/ 18IN NONSTR

## (undated) DEVICE — LN SMPL CO2 SHTRM SD STREAM W/M LUER

## (undated) DEVICE — TUBING, SUCTION, 1/4" X 10', STRAIGHT: Brand: MEDLINE

## (undated) DEVICE — ADAPT CLN BIOGUARD AIR/H2O DISP

## (undated) DEVICE — CANN O2 ETCO2 FITS ALL CONN CO2 SMPL A/ 7IN DISP LF

## (undated) DEVICE — SINGLE-USE BIOPSY FORCEPS: Brand: RADIAL JAW 4

## (undated) DEVICE — KT ORCA ORCAPOD DISP STRL